# Patient Record
Sex: FEMALE | Race: BLACK OR AFRICAN AMERICAN | NOT HISPANIC OR LATINO | URBAN - METROPOLITAN AREA
[De-identification: names, ages, dates, MRNs, and addresses within clinical notes are randomized per-mention and may not be internally consistent; named-entity substitution may affect disease eponyms.]

---

## 2021-07-27 ENCOUNTER — OUTPATIENT (OUTPATIENT)
Dept: OUTPATIENT SERVICES | Facility: HOSPITAL | Age: 21
LOS: 1 days | End: 2021-07-27
Payer: COMMERCIAL

## 2021-07-27 VITALS — HEART RATE: 81 BPM | SYSTOLIC BLOOD PRESSURE: 127 MMHG | DIASTOLIC BLOOD PRESSURE: 63 MMHG

## 2021-07-27 VITALS — OXYGEN SATURATION: 96 % | HEART RATE: 84 BPM

## 2021-07-27 DIAGNOSIS — Z3A.00 WEEKS OF GESTATION OF PREGNANCY NOT SPECIFIED: ICD-10-CM

## 2021-07-27 DIAGNOSIS — O26.899 OTHER SPECIFIED PREGNANCY RELATED CONDITIONS, UNSPECIFIED TRIMESTER: ICD-10-CM

## 2021-07-27 LAB
ALBUMIN SERPL ELPH-MCNC: 3.9 G/DL — SIGNIFICANT CHANGE UP (ref 3.3–5)
ALP SERPL-CCNC: 38 U/L — LOW (ref 40–120)
ALT FLD-CCNC: 58 U/L — HIGH (ref 10–45)
ANION GAP SERPL CALC-SCNC: 11 MMOL/L — SIGNIFICANT CHANGE UP (ref 5–17)
APPEARANCE UR: CLEAR — SIGNIFICANT CHANGE UP
AST SERPL-CCNC: 37 U/L — SIGNIFICANT CHANGE UP (ref 10–40)
BACTERIA # UR AUTO: NEGATIVE — SIGNIFICANT CHANGE UP
BASOPHILS # BLD AUTO: 0.01 K/UL — SIGNIFICANT CHANGE UP (ref 0–0.2)
BASOPHILS NFR BLD AUTO: 0.2 % — SIGNIFICANT CHANGE UP (ref 0–2)
BILIRUB SERPL-MCNC: 0.2 MG/DL — SIGNIFICANT CHANGE UP (ref 0.2–1.2)
BILIRUB UR-MCNC: NEGATIVE — SIGNIFICANT CHANGE UP
BUN SERPL-MCNC: <4 MG/DL — LOW (ref 7–23)
CALCIUM SERPL-MCNC: 9.8 MG/DL — SIGNIFICANT CHANGE UP (ref 8.4–10.5)
CHLORIDE SERPL-SCNC: 102 MMOL/L — SIGNIFICANT CHANGE UP (ref 96–108)
CO2 SERPL-SCNC: 23 MMOL/L — SIGNIFICANT CHANGE UP (ref 22–31)
COLOR SPEC: SIGNIFICANT CHANGE UP
CREAT SERPL-MCNC: 0.38 MG/DL — LOW (ref 0.5–1.3)
DIFF PNL FLD: NEGATIVE — SIGNIFICANT CHANGE UP
EOSINOPHIL # BLD AUTO: 0.31 K/UL — SIGNIFICANT CHANGE UP (ref 0–0.5)
EOSINOPHIL NFR BLD AUTO: 4.7 % — SIGNIFICANT CHANGE UP (ref 0–6)
EPI CELLS # UR: 1 /HPF — SIGNIFICANT CHANGE UP
GLUCOSE SERPL-MCNC: 74 MG/DL — SIGNIFICANT CHANGE UP (ref 70–99)
GLUCOSE UR QL: NEGATIVE — SIGNIFICANT CHANGE UP
HCT VFR BLD CALC: 33.6 % — LOW (ref 34.5–45)
HGB BLD-MCNC: 11.9 G/DL — SIGNIFICANT CHANGE UP (ref 11.5–15.5)
HYALINE CASTS # UR AUTO: 1 /LPF — SIGNIFICANT CHANGE UP (ref 0–2)
IMM GRANULOCYTES NFR BLD AUTO: 1.1 % — SIGNIFICANT CHANGE UP (ref 0–1.5)
KETONES UR-MCNC: NEGATIVE — SIGNIFICANT CHANGE UP
LEUKOCYTE ESTERASE UR-ACNC: NEGATIVE — SIGNIFICANT CHANGE UP
LYMPHOCYTES # BLD AUTO: 0.93 K/UL — LOW (ref 1–3.3)
LYMPHOCYTES # BLD AUTO: 14 % — SIGNIFICANT CHANGE UP (ref 13–44)
MCHC RBC-ENTMCNC: 31.7 PG — SIGNIFICANT CHANGE UP (ref 27–34)
MCHC RBC-ENTMCNC: 35.4 GM/DL — SIGNIFICANT CHANGE UP (ref 32–36)
MCV RBC AUTO: 89.6 FL — SIGNIFICANT CHANGE UP (ref 80–100)
MONOCYTES # BLD AUTO: 0.84 K/UL — SIGNIFICANT CHANGE UP (ref 0–0.9)
MONOCYTES NFR BLD AUTO: 12.7 % — SIGNIFICANT CHANGE UP (ref 2–14)
NEUTROPHILS # BLD AUTO: 4.47 K/UL — SIGNIFICANT CHANGE UP (ref 1.8–7.4)
NEUTROPHILS NFR BLD AUTO: 67.3 % — SIGNIFICANT CHANGE UP (ref 43–77)
NITRITE UR-MCNC: NEGATIVE — SIGNIFICANT CHANGE UP
NRBC # BLD: 0 /100 WBCS — SIGNIFICANT CHANGE UP (ref 0–0)
PH UR: 7.5 — SIGNIFICANT CHANGE UP (ref 5–8)
PLATELET # BLD AUTO: 197 K/UL — SIGNIFICANT CHANGE UP (ref 150–400)
POTASSIUM SERPL-MCNC: 3.8 MMOL/L — SIGNIFICANT CHANGE UP (ref 3.5–5.3)
POTASSIUM SERPL-SCNC: 3.8 MMOL/L — SIGNIFICANT CHANGE UP (ref 3.5–5.3)
PROT SERPL-MCNC: 6.8 G/DL — SIGNIFICANT CHANGE UP (ref 6–8.3)
PROT UR-MCNC: NEGATIVE — SIGNIFICANT CHANGE UP
RBC # BLD: 3.75 M/UL — LOW (ref 3.8–5.2)
RBC # FLD: 12.4 % — SIGNIFICANT CHANGE UP (ref 10.3–14.5)
RBC CASTS # UR COMP ASSIST: 1 /HPF — SIGNIFICANT CHANGE UP (ref 0–4)
SODIUM SERPL-SCNC: 136 MMOL/L — SIGNIFICANT CHANGE UP (ref 135–145)
SP GR SPEC: 1.01 — SIGNIFICANT CHANGE UP (ref 1.01–1.02)
UROBILINOGEN FLD QL: NEGATIVE — SIGNIFICANT CHANGE UP
WBC # BLD: 6.63 K/UL — SIGNIFICANT CHANGE UP (ref 3.8–10.5)
WBC # FLD AUTO: 6.63 K/UL — SIGNIFICANT CHANGE UP (ref 3.8–10.5)
WBC UR QL: 2 /HPF — SIGNIFICANT CHANGE UP (ref 0–5)

## 2021-07-27 PROCEDURE — G0463: CPT

## 2021-07-27 PROCEDURE — 81001 URINALYSIS AUTO W/SCOPE: CPT

## 2021-07-27 PROCEDURE — 80053 COMPREHEN METABOLIC PANEL: CPT

## 2021-07-27 PROCEDURE — 85025 COMPLETE CBC W/AUTO DIFF WBC: CPT

## 2021-07-27 RX ORDER — SODIUM CHLORIDE 9 MG/ML
1000 INJECTION, SOLUTION INTRAVENOUS
Refills: 0 | Status: DISCONTINUED | OUTPATIENT
Start: 2021-07-27 | End: 2021-08-10

## 2021-07-27 NOTE — OB PROVIDER TRIAGE NOTE - NSOBPROVIDERNOTE_OBGYN_ALL_OB_FT
Pt is a 21y  at 21.6w presenting to L&D due to abdominal as well as intermittent back and R leg pain since last night. Pt hemodynamically stable, no signs of PTL. Pt afebrile with normal WBC count. Pt's abdominal symptoms likely related to gas pain vs. dehydration vs. gastroenteritis, r/o UTI and nephrolithiasis, appendicitis less likely.    Plan:  -IVF  -UA  -CBC  -CMP  -Observe for worsening pain or signs of ctx Pt is a 21y  at 21.6w presenting to L&D due to abdominal as well as intermittent back and R leg pain since last night. Pt hemodynamically stable, no signs of PTL. Pt afebrile with normal WBC count. Pt's abdominal symptoms likely related to gas pain vs. dehydration vs. gastroenteritis, r/o UTI and nephrolithiasis, appendicitis less likely.    Plan:  -IVF  -UA  -CBC  -CMP  -Observe for worsening pain or signs of ctx    PA Note  Pt seen and examined with Sujey Iqbal and Dr Koby Calvert. Agree with above note. WIll follow up on labs and see if pt tolerates regular diet.  Sherin Mendoza PAC Pt is a 21y  at 21.6w presenting to L&D due to abdominal as well as intermittent back and R leg pain since last night. Pt hemodynamically stable, no signs of PTL. Pt afebrile with normal WBC count. Pt's abdominal symptoms likely related to gas pain vs. dehydration vs. gastroenteritis, r/o UTI and nephrolithiasis, appendicitis less likely.    Plan:  -IVF  -UA  -CBC  -CMP  -Observe for worsening pain or signs of ctx    PA Note  Pt seen and examined with Sujey Iqbal and Dr Koby Calvert. Agree with above note. WIll follow up on labs and see if pt tolerates regular diet.  Sherin GOODWIN    Pt feeling better after eating. Labs reviewed and wnl except for ALT 58. Results printed out for pt to discuss with Dr Encinas.  DC Home. Discussed with Dr Nehal GOODWIN

## 2021-07-27 NOTE — OB RN TRIAGE NOTE - CHIEF COMPLAINT QUOTE
Since last night I have lower abdominal pian and back pain. I also have pain down rt leg and right arm pain.

## 2021-07-27 NOTE — OB PROVIDER TRIAGE NOTE - NSHPPHYSICALEXAM_GEN_ALL_CORE
Vital Signs Last 24 Hrs  T(C): 37.1 (27 Jul 2021 10:18), Max: 37.1 (27 Jul 2021 10:18)  T(F): 98.8 (27 Jul 2021 10:18), Max: 98.8 (27 Jul 2021 10:18)  HR: 69 (27 Jul 2021 11:45) (69 - 86)  BP: 127/63 (27 Jul 2021 10:18) (104/68 - 127/63)  BP(mean): --  RR: 18 (27 Jul 2021 10:18) (16 - 18)  SpO2: 100% (27 Jul 2021 11:45) (90% - 100%)    Physical Exam:  Gen: NAD  Resp: breathing comfortably on RA  Abd: gravid, soft, TTP in RUQ and RLQ with notable gas pocket  Back: +R musculoskeletal pain, no CVA tenderness  Ext: no peripheral edema  SVE: cervix closed and long    [place]: no contractions

## 2021-07-27 NOTE — OB PROVIDER TRIAGE NOTE - NSHPLABSRESULTS_GEN_ALL_CORE
11.9   6.63  )-----------( 197      ( 2021 11:21 )             33.6     Urinalysis Basic - ( 2021 11:21 )    Color: Light Yellow / Appearance: Clear / S.014 / pH: x  Gluc: x / Ketone: Negative  / Bili: Negative / Urobili: Negative   Blood: x / Protein: Negative / Nitrite: Negative   Leuk Esterase: Negative / RBC: 1 /hpf / WBC 2 /HPF   Sq Epi: x / Non Sq Epi: 1 /hpf / Bacteria: Negative

## 2021-07-27 NOTE — OB PROVIDER TRIAGE NOTE - HISTORY OF PRESENT ILLNESS
Pt is a 21y  at 21.6w presenting to L&D due to abdominal pain as well as back and R leg pain since last night. Pt reports the abdominal pain began suddenly and describes the pain as pressure and a tightening sensation. She reports the abdominal pain is diffuse and periumbilical. She describes the back pain as shock-like sensations radiating down her R leg. She denies hx of similar pain episodes earlier this pregnancy. The last time the pt ate or drank was 7p last night when she ate a burrito. Pt reports passing a normal BM this morning. Pt reports a hx of intermittent constipation during the pregnancy, was previously on Fe supplement but stopped taking about 2w ago due to constipation. Pt denies vaginal bleeding, leakage of fluid, or contractions. +FM. Pt denies SOB, chest pain, headache, vision changes, or leg swelling. She denies fevers, chills, diarrhea, dysuria, hematuria, or sick contacts.     OB Hx: circumvallate placenta (pt of Dr. Zapata at [place]), no complications thus far in the pregnancy  GYN Hx: denies fibroids, cysts, STIs. Pt denies ever having Pap smear.  PMH: asthma (pt reports is well-controlled), sickle cell trait  PSHx: denies  Meds: albuterol inhaler PRN, PNV  All: seafood, NKDA  Social: pt lives with her mom and sisters, reports feeling safe at home. Denies smoking, alcohol or drug use.

## 2021-08-05 PROBLEM — Z00.00 ENCOUNTER FOR PREVENTIVE HEALTH EXAMINATION: Noted: 2021-08-05

## 2021-08-10 ENCOUNTER — APPOINTMENT (OUTPATIENT)
Dept: ANTEPARTUM | Facility: CLINIC | Age: 21
End: 2021-08-10
Payer: COMMERCIAL

## 2021-08-10 ENCOUNTER — ASOB RESULT (OUTPATIENT)
Age: 21
End: 2021-08-10

## 2021-08-10 PROCEDURE — 76811 OB US DETAILED SNGL FETUS: CPT

## 2021-08-10 PROCEDURE — 99203 OFFICE O/P NEW LOW 30 MIN: CPT | Mod: 25

## 2021-08-10 PROCEDURE — 76817 TRANSVAGINAL US OBSTETRIC: CPT

## 2021-09-22 ENCOUNTER — APPOINTMENT (OUTPATIENT)
Dept: ANTEPARTUM | Facility: CLINIC | Age: 21
End: 2021-09-22

## 2021-09-29 ENCOUNTER — APPOINTMENT (OUTPATIENT)
Dept: ANTEPARTUM | Facility: CLINIC | Age: 21
End: 2021-09-29
Payer: COMMERCIAL

## 2021-09-29 ENCOUNTER — ASOB RESULT (OUTPATIENT)
Age: 21
End: 2021-09-29

## 2021-09-29 PROCEDURE — 76816 OB US FOLLOW-UP PER FETUS: CPT

## 2021-10-27 ENCOUNTER — ASOB RESULT (OUTPATIENT)
Age: 21
End: 2021-10-27

## 2021-10-27 ENCOUNTER — APPOINTMENT (OUTPATIENT)
Dept: ANTEPARTUM | Facility: CLINIC | Age: 21
End: 2021-10-27
Payer: COMMERCIAL

## 2021-10-27 ENCOUNTER — APPOINTMENT (OUTPATIENT)
Dept: ANTEPARTUM | Facility: CLINIC | Age: 21
End: 2021-10-27

## 2021-10-27 PROCEDURE — 76816 OB US FOLLOW-UP PER FETUS: CPT

## 2021-10-27 PROCEDURE — 76818 FETAL BIOPHYS PROFILE W/NST: CPT

## 2021-11-17 ENCOUNTER — OUTPATIENT (OUTPATIENT)
Dept: OUTPATIENT SERVICES | Facility: HOSPITAL | Age: 21
LOS: 1 days | End: 2021-11-17
Payer: MEDICAID

## 2021-11-17 DIAGNOSIS — O26.899 OTHER SPECIFIED PREGNANCY RELATED CONDITIONS, UNSPECIFIED TRIMESTER: ICD-10-CM

## 2021-11-17 DIAGNOSIS — Z3A.00 WEEKS OF GESTATION OF PREGNANCY NOT SPECIFIED: ICD-10-CM

## 2021-11-17 PROCEDURE — G0463: CPT

## 2021-11-17 PROCEDURE — 59025 FETAL NON-STRESS TEST: CPT

## 2021-11-26 ENCOUNTER — RESULT REVIEW (OUTPATIENT)
Age: 21
End: 2021-11-26

## 2021-11-26 ENCOUNTER — OUTPATIENT (OUTPATIENT)
Dept: OUTPATIENT SERVICES | Facility: HOSPITAL | Age: 21
LOS: 1 days | End: 2021-11-26
Payer: MEDICAID

## 2021-11-26 DIAGNOSIS — O26.899 OTHER SPECIFIED PREGNANCY RELATED CONDITIONS, UNSPECIFIED TRIMESTER: ICD-10-CM

## 2021-11-26 DIAGNOSIS — Z3A.00 WEEKS OF GESTATION OF PREGNANCY NOT SPECIFIED: ICD-10-CM

## 2021-11-26 LAB — AMNISURE ROM (RUPTURE OF MEMBRANES): NEGATIVE — SIGNIFICANT CHANGE UP

## 2021-11-26 PROCEDURE — 59025 FETAL NON-STRESS TEST: CPT

## 2021-11-26 PROCEDURE — 76818 FETAL BIOPHYS PROFILE W/NST: CPT

## 2021-11-26 PROCEDURE — 76818 FETAL BIOPHYS PROFILE W/NST: CPT | Mod: 26

## 2021-11-26 PROCEDURE — 84112 EVAL AMNIOTIC FLUID PROTEIN: CPT

## 2021-11-26 PROCEDURE — G0463: CPT

## 2021-11-27 ENCOUNTER — INPATIENT (INPATIENT)
Facility: HOSPITAL | Age: 21
LOS: 1 days | Discharge: HOME | End: 2021-11-29
Attending: OBSTETRICS & GYNECOLOGY | Admitting: OBSTETRICS & GYNECOLOGY
Payer: COMMERCIAL

## 2021-11-27 ENCOUNTER — OUTPATIENT (OUTPATIENT)
Dept: OUTPATIENT SERVICES | Facility: HOSPITAL | Age: 21
LOS: 1 days | End: 2021-11-27
Payer: MEDICAID

## 2021-11-27 VITALS
TEMPERATURE: 100 F | HEART RATE: 102 BPM | RESPIRATION RATE: 20 BRPM | DIASTOLIC BLOOD PRESSURE: 85 MMHG | SYSTOLIC BLOOD PRESSURE: 152 MMHG

## 2021-11-27 DIAGNOSIS — Z3A.00 WEEKS OF GESTATION OF PREGNANCY NOT SPECIFIED: ICD-10-CM

## 2021-11-27 DIAGNOSIS — O26.899 OTHER SPECIFIED PREGNANCY RELATED CONDITIONS, UNSPECIFIED TRIMESTER: ICD-10-CM

## 2021-11-27 PROCEDURE — 59025 FETAL NON-STRESS TEST: CPT

## 2021-11-27 PROCEDURE — G0463: CPT

## 2021-11-28 ENCOUNTER — TRANSCRIPTION ENCOUNTER (OUTPATIENT)
Age: 21
End: 2021-11-28

## 2021-11-28 DIAGNOSIS — Z34.80 ENCOUNTER FOR SUPERVISION OF OTHER NORMAL PREGNANCY, UNSPECIFIED TRIMESTER: ICD-10-CM

## 2021-11-28 LAB
ALBUMIN SERPL ELPH-MCNC: 3.8 G/DL — SIGNIFICANT CHANGE UP (ref 3.3–5)
ALP SERPL-CCNC: 202 U/L — HIGH (ref 40–120)
ALT FLD-CCNC: 11 U/L — SIGNIFICANT CHANGE UP (ref 10–45)
ANION GAP SERPL CALC-SCNC: 15 MMOL/L — SIGNIFICANT CHANGE UP (ref 5–17)
APPEARANCE UR: CLEAR — SIGNIFICANT CHANGE UP
APTT BLD: 25.5 SEC — LOW (ref 27.5–35.5)
AST SERPL-CCNC: 22 U/L — SIGNIFICANT CHANGE UP (ref 10–40)
BACTERIA # UR AUTO: NEGATIVE — SIGNIFICANT CHANGE UP
BASOPHILS # BLD AUTO: 0.02 K/UL — SIGNIFICANT CHANGE UP (ref 0–0.2)
BASOPHILS NFR BLD AUTO: 0.3 % — SIGNIFICANT CHANGE UP (ref 0–2)
BILIRUB SERPL-MCNC: 0.4 MG/DL — SIGNIFICANT CHANGE UP (ref 0.2–1.2)
BILIRUB UR-MCNC: NEGATIVE — SIGNIFICANT CHANGE UP
BLD GP AB SCN SERPL QL: NEGATIVE — SIGNIFICANT CHANGE UP
BUN SERPL-MCNC: 4 MG/DL — LOW (ref 7–23)
CALCIUM SERPL-MCNC: 9 MG/DL — SIGNIFICANT CHANGE UP (ref 8.4–10.5)
CHLORIDE SERPL-SCNC: 102 MMOL/L — SIGNIFICANT CHANGE UP (ref 96–108)
CO2 SERPL-SCNC: 20 MMOL/L — LOW (ref 22–31)
COLOR SPEC: YELLOW — SIGNIFICANT CHANGE UP
COVID-19 SPIKE DOMAIN AB INTERP: NEGATIVE — SIGNIFICANT CHANGE UP
COVID-19 SPIKE DOMAIN ANTIBODY RESULT: 0.4 U/ML — SIGNIFICANT CHANGE UP
CREAT ?TM UR-MCNC: 135 MG/DL — SIGNIFICANT CHANGE UP
CREAT SERPL-MCNC: 0.63 MG/DL — SIGNIFICANT CHANGE UP (ref 0.5–1.3)
DIFF PNL FLD: NEGATIVE — SIGNIFICANT CHANGE UP
EOSINOPHIL # BLD AUTO: 0.34 K/UL — SIGNIFICANT CHANGE UP (ref 0–0.5)
EOSINOPHIL NFR BLD AUTO: 4.5 % — SIGNIFICANT CHANGE UP (ref 0–6)
EPI CELLS # UR: 2 /HPF — SIGNIFICANT CHANGE UP
FIBRINOGEN PPP-MCNC: 646 MG/DL — HIGH (ref 290–520)
GLUCOSE SERPL-MCNC: 79 MG/DL — SIGNIFICANT CHANGE UP (ref 70–99)
GLUCOSE UR QL: NEGATIVE — SIGNIFICANT CHANGE UP
HCT VFR BLD CALC: 35.2 % — SIGNIFICANT CHANGE UP (ref 34.5–45)
HGB BLD-MCNC: 11.8 G/DL — SIGNIFICANT CHANGE UP (ref 11.5–15.5)
HYALINE CASTS # UR AUTO: 0 /LPF — SIGNIFICANT CHANGE UP (ref 0–7)
IMM GRANULOCYTES NFR BLD AUTO: 0.4 % — SIGNIFICANT CHANGE UP (ref 0–1.5)
INR BLD: 0.97 RATIO — SIGNIFICANT CHANGE UP (ref 0.88–1.16)
KETONES UR-MCNC: NEGATIVE — SIGNIFICANT CHANGE UP
LDH SERPL L TO P-CCNC: 253 U/L — HIGH (ref 50–242)
LEUKOCYTE ESTERASE UR-ACNC: NEGATIVE — SIGNIFICANT CHANGE UP
LYMPHOCYTES # BLD AUTO: 0.7 K/UL — LOW (ref 1–3.3)
LYMPHOCYTES # BLD AUTO: 9.3 % — LOW (ref 13–44)
MCHC RBC-ENTMCNC: 29.4 PG — SIGNIFICANT CHANGE UP (ref 27–34)
MCHC RBC-ENTMCNC: 33.5 GM/DL — SIGNIFICANT CHANGE UP (ref 32–36)
MCV RBC AUTO: 87.8 FL — SIGNIFICANT CHANGE UP (ref 80–100)
MONOCYTES # BLD AUTO: 1.15 K/UL — HIGH (ref 0–0.9)
MONOCYTES NFR BLD AUTO: 15.3 % — HIGH (ref 2–14)
NEUTROPHILS # BLD AUTO: 5.27 K/UL — SIGNIFICANT CHANGE UP (ref 1.8–7.4)
NEUTROPHILS NFR BLD AUTO: 70.2 % — SIGNIFICANT CHANGE UP (ref 43–77)
NITRITE UR-MCNC: NEGATIVE — SIGNIFICANT CHANGE UP
NRBC # BLD: 0 /100 WBCS — SIGNIFICANT CHANGE UP (ref 0–0)
PH UR: 7 — SIGNIFICANT CHANGE UP (ref 5–8)
PLATELET # BLD AUTO: 166 K/UL — SIGNIFICANT CHANGE UP (ref 150–400)
POTASSIUM SERPL-MCNC: 3.5 MMOL/L — SIGNIFICANT CHANGE UP (ref 3.5–5.3)
POTASSIUM SERPL-SCNC: 3.5 MMOL/L — SIGNIFICANT CHANGE UP (ref 3.5–5.3)
PROT ?TM UR-MCNC: 32 MG/DL — HIGH (ref 0–12)
PROT ?TM UR-MCNC: 33 MG/DL — HIGH (ref 0–12)
PROT SERPL-MCNC: 6.7 G/DL — SIGNIFICANT CHANGE UP (ref 6–8.3)
PROT UR-MCNC: ABNORMAL
PROT/CREAT UR-RTO: 0.2 RATIO — SIGNIFICANT CHANGE UP (ref 0–0.2)
PROTHROM AB SERPL-ACNC: 11.7 SEC — SIGNIFICANT CHANGE UP (ref 10.6–13.6)
RAPID RVP RESULT: DETECTED
RBC # BLD: 4.01 M/UL — SIGNIFICANT CHANGE UP (ref 3.8–5.2)
RBC # FLD: 13.1 % — SIGNIFICANT CHANGE UP (ref 10.3–14.5)
RBC CASTS # UR COMP ASSIST: 2 /HPF — SIGNIFICANT CHANGE UP (ref 0–4)
RH IG SCN BLD-IMP: POSITIVE — SIGNIFICANT CHANGE UP
RV+EV RNA SPEC QL NAA+PROBE: DETECTED
SARS-COV-2 IGG+IGM SERPL QL IA: 0.4 U/ML — SIGNIFICANT CHANGE UP
SARS-COV-2 IGG+IGM SERPL QL IA: NEGATIVE — SIGNIFICANT CHANGE UP
SARS-COV-2 RNA SPEC QL NAA+PROBE: SIGNIFICANT CHANGE UP
SODIUM SERPL-SCNC: 137 MMOL/L — SIGNIFICANT CHANGE UP (ref 135–145)
SP GR SPEC: 1.01 — SIGNIFICANT CHANGE UP (ref 1.01–1.02)
URATE SERPL-MCNC: 4.9 MG/DL — SIGNIFICANT CHANGE UP (ref 2.5–7)
UROBILINOGEN FLD QL: ABNORMAL
WBC # BLD: 7.51 K/UL — SIGNIFICANT CHANGE UP (ref 3.8–10.5)
WBC # FLD AUTO: 7.51 K/UL — SIGNIFICANT CHANGE UP (ref 3.8–10.5)
WBC UR QL: 3 /HPF — SIGNIFICANT CHANGE UP (ref 0–5)

## 2021-11-28 PROCEDURE — 88307 TISSUE EXAM BY PATHOLOGIST: CPT | Mod: 26

## 2021-11-28 PROCEDURE — 59409 OBSTETRICAL CARE: CPT | Mod: U9,GC

## 2021-11-28 RX ORDER — AER TRAVELER 0.5 G/1
1 SOLUTION RECTAL; TOPICAL EVERY 4 HOURS
Refills: 0 | Status: DISCONTINUED | OUTPATIENT
Start: 2021-11-28 | End: 2021-11-29

## 2021-11-28 RX ORDER — IBUPROFEN 200 MG
600 TABLET ORAL EVERY 6 HOURS
Refills: 0 | Status: COMPLETED | OUTPATIENT
Start: 2021-11-28 | End: 2022-10-27

## 2021-11-28 RX ORDER — DIBUCAINE 1 %
1 OINTMENT (GRAM) RECTAL EVERY 6 HOURS
Refills: 0 | Status: DISCONTINUED | OUTPATIENT
Start: 2021-11-28 | End: 2021-11-29

## 2021-11-28 RX ORDER — ACETAMINOPHEN 500 MG
975 TABLET ORAL
Refills: 0 | Status: DISCONTINUED | OUTPATIENT
Start: 2021-11-28 | End: 2021-11-29

## 2021-11-28 RX ORDER — MAGNESIUM HYDROXIDE 400 MG/1
30 TABLET, CHEWABLE ORAL
Refills: 0 | Status: DISCONTINUED | OUTPATIENT
Start: 2021-11-28 | End: 2021-11-29

## 2021-11-28 RX ORDER — TETANUS TOXOID, REDUCED DIPHTHERIA TOXOID AND ACELLULAR PERTUSSIS VACCINE, ADSORBED 5; 2.5; 8; 8; 2.5 [IU]/.5ML; [IU]/.5ML; UG/.5ML; UG/.5ML; UG/.5ML
0.5 SUSPENSION INTRAMUSCULAR ONCE
Refills: 0 | Status: DISCONTINUED | OUTPATIENT
Start: 2021-11-28 | End: 2021-11-29

## 2021-11-28 RX ORDER — OXYTOCIN 10 UNIT/ML
333.33 VIAL (ML) INJECTION
Qty: 20 | Refills: 0 | Status: DISCONTINUED | OUTPATIENT
Start: 2021-11-28 | End: 2021-11-29

## 2021-11-28 RX ORDER — SODIUM CHLORIDE 9 MG/ML
1000 INJECTION, SOLUTION INTRAVENOUS
Refills: 0 | Status: DISCONTINUED | OUTPATIENT
Start: 2021-11-28 | End: 2021-11-28

## 2021-11-28 RX ORDER — IBUPROFEN 200 MG
600 TABLET ORAL EVERY 6 HOURS
Refills: 0 | Status: DISCONTINUED | OUTPATIENT
Start: 2021-11-28 | End: 2021-11-29

## 2021-11-28 RX ORDER — OXYCODONE HYDROCHLORIDE 5 MG/1
5 TABLET ORAL
Refills: 0 | Status: DISCONTINUED | OUTPATIENT
Start: 2021-11-28 | End: 2021-11-29

## 2021-11-28 RX ORDER — DIPHENHYDRAMINE HCL 50 MG
25 CAPSULE ORAL EVERY 6 HOURS
Refills: 0 | Status: DISCONTINUED | OUTPATIENT
Start: 2021-11-28 | End: 2021-11-29

## 2021-11-28 RX ORDER — SIMETHICONE 80 MG/1
80 TABLET, CHEWABLE ORAL EVERY 4 HOURS
Refills: 0 | Status: DISCONTINUED | OUTPATIENT
Start: 2021-11-28 | End: 2021-11-29

## 2021-11-28 RX ORDER — PRAMOXINE HYDROCHLORIDE 150 MG/15G
1 AEROSOL, FOAM RECTAL EVERY 4 HOURS
Refills: 0 | Status: DISCONTINUED | OUTPATIENT
Start: 2021-11-28 | End: 2021-11-29

## 2021-11-28 RX ORDER — HYDROCORTISONE 1 %
1 OINTMENT (GRAM) TOPICAL EVERY 6 HOURS
Refills: 0 | Status: DISCONTINUED | OUTPATIENT
Start: 2021-11-28 | End: 2021-11-29

## 2021-11-28 RX ORDER — KETOROLAC TROMETHAMINE 30 MG/ML
30 SYRINGE (ML) INJECTION ONCE
Refills: 0 | Status: DISCONTINUED | OUTPATIENT
Start: 2021-11-28 | End: 2021-11-29

## 2021-11-28 RX ORDER — CITRIC ACID/SODIUM CITRATE 300-500 MG
15 SOLUTION, ORAL ORAL EVERY 6 HOURS
Refills: 0 | Status: DISCONTINUED | OUTPATIENT
Start: 2021-11-28 | End: 2021-11-28

## 2021-11-28 RX ORDER — OXYTOCIN 10 UNIT/ML
2 VIAL (ML) INJECTION
Qty: 30 | Refills: 0 | Status: DISCONTINUED | OUTPATIENT
Start: 2021-11-28 | End: 2021-11-28

## 2021-11-28 RX ORDER — LANOLIN
1 OINTMENT (GRAM) TOPICAL EVERY 6 HOURS
Refills: 0 | Status: DISCONTINUED | OUTPATIENT
Start: 2021-11-28 | End: 2021-11-29

## 2021-11-28 RX ORDER — OXYTOCIN 10 UNIT/ML
10 VIAL (ML) INJECTION ONCE
Refills: 0 | Status: DISCONTINUED | OUTPATIENT
Start: 2021-11-28 | End: 2021-11-29

## 2021-11-28 RX ORDER — BENZOCAINE 10 %
1 GEL (GRAM) MUCOUS MEMBRANE EVERY 6 HOURS
Refills: 0 | Status: DISCONTINUED | OUTPATIENT
Start: 2021-11-28 | End: 2021-11-29

## 2021-11-28 RX ORDER — SODIUM CHLORIDE 9 MG/ML
3 INJECTION INTRAMUSCULAR; INTRAVENOUS; SUBCUTANEOUS EVERY 8 HOURS
Refills: 0 | Status: DISCONTINUED | OUTPATIENT
Start: 2021-11-28 | End: 2021-11-29

## 2021-11-28 RX ORDER — OXYCODONE HYDROCHLORIDE 5 MG/1
5 TABLET ORAL ONCE
Refills: 0 | Status: DISCONTINUED | OUTPATIENT
Start: 2021-11-28 | End: 2021-11-29

## 2021-11-28 RX ADMIN — Medication 975 MILLIGRAM(S): at 20:59

## 2021-11-28 RX ADMIN — SODIUM CHLORIDE 125 MILLILITER(S): 9 INJECTION, SOLUTION INTRAVENOUS at 03:04

## 2021-11-28 RX ADMIN — Medication 975 MILLIGRAM(S): at 20:29

## 2021-11-28 RX ADMIN — Medication 4 MILLIUNIT(S)/MIN: at 09:09

## 2021-11-28 RX ADMIN — Medication 600 MILLIGRAM(S): at 23:34

## 2021-11-28 NOTE — DISCHARGE NOTE OB - PLAN OF CARE
Make your follow-up appointment with your doctor in 3 days for a blood pressure check after discharge. After discharge, please stay on pelvic rest for 6 weeks, meaning no sexual intercourse, no tampons and no douching.  No driving for 2 weeks as women can loose a lot of blood during delivery and there is a possibility of being lightheaded/fainting.  No lifting objects heavier than baby for two weeks.  Expect to have vaginal bleeding/spotting for up to six weeks.  The bleeding should get lighter and more white/light brown with time.  For bleeding soaking more than a pad an hour or passing clots greater than the size of your fist, come in to the emergency department. Call your doctor with any signs and symptoms of infection such as fever, chills, nausea, or vomiting. Call your doctor if you're unable to tolerate food, increase in vaginal bleeding, or have difficulty urinating. Call your doctor if you have pain that is not relieved by your prescribed medications. Notify your doctor with any other concerns.   No heavy lifting, driving, or strenuous activity for 6 weeks.     Follow up in clinic in 3 days for a blood pressure check and in 6 weeks for your routine postpartum visit. Make your follow-up appointment with your doctor in 3 days for a blood pressure check after discharge and 6 weeks for your routine postpartum visit. After discharge, please stay on pelvic rest for 6 weeks, meaning no sexual intercourse, no tampons and no douching.  No driving for 2 weeks as women can loose a lot of blood during delivery and there is a possibility of being lightheaded/fainting.  No lifting objects heavier than baby for two weeks.  Expect to have vaginal bleeding/spotting for up to six weeks.  The bleeding should get lighter and more white/light brown with time.  For bleeding soaking more than a pad an hour or passing clots greater than the size of your fist, come in to the emergency department. Call your doctor with any signs and symptoms of infection such as fever, chills, nausea, or vomiting. Call your doctor if you're unable to tolerate food, increase in vaginal bleeding, or have difficulty urinating. Call your doctor if you have pain that is not relieved by your prescribed medications. Notify your doctor with any other concerns.   No heavy lifting, driving, or strenuous activity for 6 weeks.     Follow up in clinic in 3 days for a blood pressure check and in 6 weeks for your routine postpartum visit.

## 2021-11-28 NOTE — OB RN DELIVERY SUMMARY - NS_GBSABX_OBGYN_ALL_OB
Spoke with the patient relaying message below and informed that she should make sure to get her fasting labs done.     No further questions or concerns at this time.    N/A

## 2021-11-28 NOTE — OB PROVIDER H&P - ASSESSMENT
A/P: Pt is a 22yo  who presents in early labor with elevated BPs in triage.   - no PN issues, GBS negative     1. Admit to LND. Routine Labs. IVF  2. IOL with buccal cytotec  3. Fetus: Cat I tracing, Vertex, EFW 3000g. C/w EFM.  4. Elevated BPs: f/u HELLP labs, monitor BPs  5. GBS negative   6. Pain: IV pain meds/epidural PRN    Dorcas Coelho, PGY1  d/w Dr. Marin  d/w Dr. Fernandez

## 2021-11-28 NOTE — OB PROVIDER TRIAGE NOTE - HISTORY OF PRESENT ILLNESS
HPI: Pt is a 22 yo  @39+4 presenting with contractions. Pt states she has been feeling contractions irregularly for the last 2 days. They became more regular starting 7 pm tonight and increasingly painful, q5 min. Pt states she visited her regular OB at [facility] earlier today and was told she was 1 cm dilated and sent home. Pt states she came here for a 2nd opinion.   FM endorses   LOF denies   VB denies     PN Care with Dr. Yosvany Shukla at [facility].   Prenatal Issues: denies   GBS negative   EFW 3000g    OBHx:  G1 current     Gyn Hx:  denies     PMH:   denies     SHx:  denies     Psych:   denies     Social:  denies     Medications: PNV    Allergies: none     Will Accept blood transfusion? yes     Vital Signs Last 24 Hrs  T(C): 37.6 (2021 23:21), Max: 37.6 (2021 23:21)  T(F): 99.7 (2021 23:21), Max: 99.7 (2021 23:21)  HR: 94 (2021 23:59) (94 - 102)  BP: 137/90 (2021 23:59) (129/83 - 152/85)  BP(mean): --  RR: 20 (2021 23:21) (20 - 20)  SpO2: --    Physical Exam:  Gen: NAD, AOx3  CV: RR  Resp: unlabored respirations  Abd: soft, NT, ND    FHT: 130s, moderate variability, accels, no decels   [place]: q4-5 min   EFW: 3000g  Sono: vertex, fundal

## 2021-11-28 NOTE — DISCHARGE NOTE OB - HOSPITAL COURSE
21 y  G_P_ who experienced xx at _ weeks & _ days with labile BPs in triage, meeting criteria for gestational HTN (HELLP wnl, P/C 0.2).  Labor course was uncomplicated *** & delivery was uncomplicated. Postpartum course was unremarkable. Patient was transferred to postpartum floor & monitored. Pt was voiding spontaneously with normal vital signs. Patient is medically optimized for discharge & instructed to follow up with her doctor in 3 days for a blood pressure check and in 6 weeks for postpartum care. Pt provided with Rx for BP cuff and norethindrone pills for postpartum contraception. 21 y   who experienced  at 39 weeks & 3 days who met criteria for gestational HTN (HELLP wnl, P/C 0.2) during labor course. Delivery was uncomplicated. Postpartum course was unremarkable. Patient was transferred to postpartum floor & monitored. Pt was voiding spontaneously with normal vital signs. Patient is medically optimized for discharge & instructed to follow up with her doctor in 3 days for a blood pressure check and in 6 weeks for postpartum care. Pt provided with Rx for BP cuff and norethindrone pills for postpartum contraception. 21 y  who experienced  at 39 weeks & 3 days who met criteria for gestational HTN (HELLP wnl, P/C 0.2) during labor course. Delivery was uncomplicated. Postpartum course was unremarkable. Patient was transferred to postpartum floor & monitored. Pt was voiding spontaneously with normal vital signs. Patient is medically optimized for discharge & instructed to follow up with her doctor in 3 days for a blood pressure check and in 6 weeks for postpartum care.   Pt provided with Rx for BP cuff and norethindrone pills for postpartum contraception.

## 2021-11-28 NOTE — OB PROVIDER DELIVERY SUMMARY - NSPROVIDERDELIVERYNOTE_OBGYN_ALL_OB_FT
Spontaneous vaginal delivery of liveborn infant in LACEY position. Head delivered easily. No nuchal cord was noted. Shoulders and body delivered easily after. Infant was suctioned. No mec was noted. Infant was passed to mother for delayed cord clamping and skin to skin. After 1 minute, the cord was clamped and cut. Placenta delivered intact. Uterine fundal massage and post partum pitocin was started & IM pitocin given. Uterine fundus was firm. Vaginal exam was performed and noted intact cervix, vaginal walls and sulci. 2nd degree laceration was noted and repaired with 2.0 Vicryl rapide. Excellent hemostasis was noted. Count was correctx2. Pt. was stable after delivery. Spontaneous vaginal delivery of liveborn female infant in LACEY position. Head delivered easily. No nuchal cord was noted. Shoulders and body delivered easily after. Infant was suctioned. No mec was noted. Infant was passed to mother for delayed cord clamping and skin to skin. After 1 minute, the cord was clamped and cut. Placenta delivered intact. Uterine fundal massage and post partum pitocin was started & IM pitocin given. Uterine fundus was firm. Vaginal exam was performed and noted intact cervix, vaginal walls and sulci. 2nd degree laceration was noted and repaired with 2.0 Vicryl rapide. Excellent hemostasis was noted. Count was correctx2. Pt. was stable after delivery.      /Attending:    I was present for the entire delivery and I agree with the above Resident's note.    Dr. Guerrero

## 2021-11-28 NOTE — OB PROVIDER TRIAGE NOTE - NSOBPROVIDERNOTE_OBGYN_ALL_OB_FT
A/P: Pt is a 22yo  who presents in early labor. Initial BP in triage 150s systolic  - no PN issues, GBS negative     1. 4 hour BP monitoring in triage, HELLP labs sent   2. Fetus: Cat I tracing, Vertex, EFW 3000g. C/w EFM.  3. GBS negative   4. Pain: IV pain meds/epidural PRN    Dorcas Coelho, PGY1  d/w Dr. Fernandez

## 2021-11-28 NOTE — DISCHARGE NOTE OB - PATIENT PORTAL LINK FT
You can access the FollowMyHealth Patient Portal offered by [facility] by registering at the following website: [email or web address]. By joining [facility]’s FollowMyHealth portal, you will also be able to view your health information using other applications (apps) compatible with our system.

## 2021-11-28 NOTE — DISCHARGE NOTE OB - CARE PROVIDER_API CALL
[facility],   [address] [place] [address]  Phone: [phone]  Fax: (   )    -  Follow Up Time: 1-3 days    Dr. Yosvany Shukla,   [place]  Phone: (   )    -  Fax: (   )    -  Follow Up Time:

## 2021-11-28 NOTE — OB RN DELIVERY SUMMARY - NSSELHIDDEN_OBGYN_ALL_OB_FT
[NS_[email or web address]_ALL_OB_FT:[id number]],[NS_[email or web address]_OB_[id number]_DeliveryAssist2_OBGYN_ALL_OB_FT:[id number]],[NS_DeliveryRN_OBGYN_ALL_OB_FT:[id number]]

## 2021-11-28 NOTE — DISCHARGE NOTE OB - MEDICATION SUMMARY - MEDICATIONS TO TAKE
I will START or STAY ON the medications listed below when I get home from the hospital:    Blood Pressure Cuff  -- 1 application implant 3 times a day   -- Indication: For gestational hypertension    acetaminophen 500 mg oral capsule  -- 2 cap(s) by mouth every 8 hours -for moderate pain   -- This product contains acetaminophen.  Do not use  with any other product containing acetaminophen to prevent possible liver damage.    -- Indication: For pain    ibuprofen 600 mg oral tablet  -- 1 tab(s) by mouth every 6 hours -for moderate pain   -- Do not take this drug if you are pregnant.  It is very important that you take or use this exactly as directed.  Do not skip doses or discontinue unless directed by your doctor.  May cause drowsiness or dizziness.  Obtain medical advice before taking any non-prescription drugs as some may affect the action of this medication.  Take with food or milk.    -- Indication: For pain    witch hazel 50% topical pad  -- 1 application on skin every 4 hours, As needed, Perineal discomfort  -- Indication: For postpartum perineal discomfort    triamcinolone 0.1% topical ointment  -- 1 application on skin every 12 hours  -- Indication: For eczema    lanolin topical ointment  -- 1 application on skin every 6 hours, As needed, nipple soreness  -- Indication: For Nipple soreness    Prenatal Multivitamins with Vitamin B Complex, Vitamin C, Minerals and L-Methylfolate oral capsule  -- 1 cap(s) by mouth once a day   -- May discolor urine or feces.    -- Indication: For breastfeeding    norethindrone 0.35 mg oral tablet  -- 1 tab(s) by mouth once a day MDD:1 pill  -- Do not take this drug if you are pregnant.  It is very important that you take or use this exactly as directed.  Do not skip doses or discontinue unless directed by your doctor.    -- Indication: For contraception

## 2021-11-28 NOTE — OB RN PATIENT PROFILE - WILL THE PATIENT ACCEPT THE PFIZER COVID-19 VACCINE IF ELIGIBLE AND IT IS AVAILABLE?
education: N/A     Social History Main Topics    Smoking status: Current Every Day Smoker     Packs/day: 0.50     Types: Cigarettes    Smokeless tobacco: Never Used    Alcohol use No    Drug use: No    Sexual activity: Yes     Partners: Male     Other Topics Concern    None     Social History Narrative    None       SCREENINGS             PHYSICAL EXAM    (up to 7 for level 4, 8 or more for level 5)     ED Triage Vitals [09/03/18 1228]   BP Temp Temp Source Heart Rate Resp SpO2 Height Weight - Scale   130/81 98.1 °F (36.7 °C) Temporal 79 16 100 % 5' 3\" (1.6 m) 160 lb (72.6 kg)       Physical Exam   Constitutional: She is oriented to person, place, and time. She appears well-developed and well-nourished. HENT:   Head: Normocephalic and atraumatic. Right Ear: External ear normal.   Left Ear: External ear normal.   Nose: Nose normal.   Eyes: Conjunctivae are normal. Right eye exhibits no discharge. Left eye exhibits no discharge. No scleral icterus. Neck: Normal range of motion. Neck supple. Cardiovascular: Normal rate, regular rhythm, normal heart sounds and intact distal pulses. Exam reveals no gallop and no friction rub. No murmur heard. Pulmonary/Chest: Effort normal and breath sounds normal. No respiratory distress. She has no wheezes. She has no rales. Neurological: She is alert and oriented to person, place, and time. Skin: Skin is warm and dry. She is not diaphoretic. No pallor. Psychiatric: She has a normal mood and affect. Her behavior is normal. Thought content normal.   Nursing note and vitals reviewed. DIAGNOSTIC RESULTS   LABS:    Labs Reviewed - No data to display    All other labs were within normal range or not returned as of this dictation. EKG: All EKG's are interpreted by the Emergency Department Physician who either signs or Co-signs this chart in the absence of a cardiologist.  Please see their note for interpretation of EKG.       RADIOLOGY:   Non-plain film
No

## 2021-11-28 NOTE — OB PROVIDER DELIVERY SUMMARY - NSSELHIDDEN_OBGYN_ALL_OB_FT
[NS_[email or web address]_ALL_OB_FT:[id number]],[NS_DeliveryAssist1_OBGYN_ALL_OB_FT:[id number]],[NS_DeliveryAssist2_OBGYN_ALL_OB_FT:[id number]]

## 2021-11-28 NOTE — CHART NOTE - NSCHARTNOTEFT_GEN_A_CORE
/Attending:    Assumed care of this 20y/o  @39.4wks admitted for IOL 2nd to labile Bp's in triage.    Pt signed-out by day team; chart reviewed; pt seen at bedside.  PNC at [place] and fairly uncomplicated.  Pt was seen at  w/ c/o contractions and was labor was ruled out.  Pt then presented here and was noted to have labile BP's and decision made for IOL and was started on Buccal Cytotec.   Epidural in place and pt is comfortable.  Pt denies any s/sx of pre-eclampsia.  VSS, afebrile; BP: 105-154/60-97    Prenatal labs reviewed:   Blood Type: O+; GBS: neg;  HepB sAg: NR; HIV: Neg; RPR: NR    EFW: 3000gms  FHT: BL: 135 bpm  [place]: irreg ctx's  SVE: 3-4/90/-2  Memb: Intact    COVID: neg  H/H: 11.8/35.2; Plt:166; Cr.: 0.65; P/C: 0.2; AST/ALT: 22/11    A/P  -IUP @39.4wks admitted for IOL 2nd to labile BP's  -Pt's cervix more favorable and will start Pitocin for further augmentation  -Continue to monitor and reassess  -Bp's have been in the normal range, however last BP: 154/82 and now makes criteria for GHTN; continue to monitor BP's closely and for s/sx of pre-eclampsia  -Anticipate vaginal delivery at this time    Dr. Guerrero /Attending:    Assumed care of this 20y/o  @39.4wks admitted for IOL 2nd to labile Bp's in triage.    Pt signed-out by day team; chart reviewed; pt seen at bedside.  PNC at [place] and fairly uncomplicated.  Pt was seen at  w/ c/o contractions and was labor was ruled out.  Pt then presented here and was noted to have labile BP's and decision made for IOL and was started on Buccal Cytotec.   Epidural in place and pt is comfortable.  Pt denies any s/sx of pre-eclampsia.  VSS, afebrile; BP: 105-154/60-97    Prenatal labs reviewed:   Blood Type: O+; GBS: neg;  HepB sAg: NR; HIV: Neg; RPR: NR    EFW: 3000gms  FHT: BL: 135 bpm  [place]: irreg ctx's  SVE: 3-4/90/-2  Memb: SROM, clear at 8am    COVID: neg  H/H: 11.8/35.2; Plt:166; Cr.: 0.65; P/C: 0.2; AST/ALT: 22/11    A/P  -IUP @39.4wks admitted for IOL 2nd to labile BP's  -Pt's cervix more favorable and will start Pitocin for further augmentation  -Continue to monitor and reassess  -Bp's have been in the normal range, however last BP: 154/82 and now makes criteria for GHTN; continue to monitor BP's closely and for s/sx of pre-eclampsia  -Anticipate vaginal delivery at this time    Dr. Guerrero

## 2021-11-28 NOTE — OB PROVIDER LABOR PROGRESS NOTE - ASSESSMENT
Plan:  -s/p BC, JENNIFER@8a  -pt comfortable with epidural  -start pitocin  -cont EFM, toco, IVF    D/w Dr. White PGY-4  VERNELL [place] PGY-1

## 2021-11-28 NOTE — DISCHARGE NOTE OB - CARE PLAN
Principal Discharge DX:	 (normal spontaneous vaginal delivery)  Assessment and plan of treatment:	Make your follow-up appointment with your doctor in 3 days for a blood pressure check after discharge. After discharge, please stay on pelvic rest for 6 weeks, meaning no sexual intercourse, no tampons and no douching.  No driving for 2 weeks as women can loose a lot of blood during delivery and there is a possibility of being lightheaded/fainting.  No lifting objects heavier than baby for two weeks.  Expect to have vaginal bleeding/spotting for up to six weeks.  The bleeding should get lighter and more white/light brown with time.  For bleeding soaking more than a pad an hour or passing clots greater than the size of your fist, come in to the emergency department. Call your doctor with any signs and symptoms of infection such as fever, chills, nausea, or vomiting. Call your doctor if you're unable to tolerate food, increase in vaginal bleeding, or have difficulty urinating. Call your doctor if you have pain that is not relieved by your prescribed medications. Notify your doctor with any other concerns.   No heavy lifting, driving, or strenuous activity for 6 weeks.     Follow up in clinic in 3 days for a blood pressure check and in 6 weeks for your routine postpartum visit.   1 Principal Discharge DX:	 (normal spontaneous vaginal delivery)  Assessment and plan of treatment:	Make your follow-up appointment with your doctor in 3 days for a blood pressure check after discharge and 6 weeks for your routine postpartum visit. After discharge, please stay on pelvic rest for 6 weeks, meaning no sexual intercourse, no tampons and no douching.  No driving for 2 weeks as women can loose a lot of blood during delivery and there is a possibility of being lightheaded/fainting.  No lifting objects heavier than baby for two weeks.  Expect to have vaginal bleeding/spotting for up to six weeks.  The bleeding should get lighter and more white/light brown with time.  For bleeding soaking more than a pad an hour or passing clots greater than the size of your fist, come in to the emergency department. Call your doctor with any signs and symptoms of infection such as fever, chills, nausea, or vomiting. Call your doctor if you're unable to tolerate food, increase in vaginal bleeding, or have difficulty urinating. Call your doctor if you have pain that is not relieved by your prescribed medications. Notify your doctor with any other concerns.   No heavy lifting, driving, or strenuous activity for 6 weeks.     Follow up in clinic in 3 days for a blood pressure check and in 6 weeks for your routine postpartum visit.

## 2021-11-28 NOTE — OB PROVIDER H&P - HISTORY OF PRESENT ILLNESS
HPI: Pt is a 22 yo  @39+4 presenting with contractions. Pt states she has been feeling contractions irregularly for the last 2 days. They became more regular starting 7 pm tonight and increasingly painful, q5 min. Pt states she visited her regular OB at [place] earlier today and was told she was 1 cm dilated and sent home. Pt states she came here for a 2nd opinion. Elevated BPs to the 150s systolic in triage. Denies BP issues in her pregnancy.   FM endorses   LOF denies   VB denies     PN Care with Dr. Yosvany Shukla at [place].   Prenatal Issues: denies   GBS negative   EFW 3000g    OBHx:  G1 current     Gyn Hx:  denies     PMH:   denies     SHx:  denies     Psych:   denies     Social:  denies     Medications: PNV    Allergies: none     Will Accept blood transfusion? yes     Vital Signs Last 24 Hrs  T(C): 37.6 (2021 23:21), Max: 37.6 (2021 23:21)  T(F): 99.7 (2021 23:21), Max: 99.7 (2021 23:21)  HR: 94 (2021 23:59) (94 - 102)  BP: 137/90 (2021 23:59) (129/83 - 152/85)  BP(mean): --  RR: 20 (2021 23:21) (20 - 20)  SpO2: --    Physical Exam:  Gen: NAD, AOx3  CV: RR  Resp: unlabored respirations  Abd: soft, NT, ND    FHT: 130s, moderate variability, accels, no decels   Bovey: q4-5 min   EFW: 3000g  Sono: vertex, fundal

## 2021-11-28 NOTE — OB RN DELIVERY SUMMARY - BABY A: APGAR 5 MIN RESP RATE, DELIVERY
GENERAL: Awake. Alert. NAD. Well nourished.  HEENT: NC/AT, Airway patent.   LUNGS: CTAB. No wheezes or rales noted.  CARDIAC: Chest non-tender to palpation. RRR. S1 and S2 intact. No murmurs noted.  ABDOMEN: No masses noted. Soft, NT, ND, no rebound, no guarding.  EXT: No edema, no calf tenderness, distal pulses 2+ bilaterally, no deformities.  NEURO: A&Ox3. Moving all extremities. Sensation and strength intact throughout.   SKIN: Warm and dry.   PSYCH: Normal affect. (2) good, crying

## 2021-11-28 NOTE — OB RN DELIVERY SUMMARY - NS_SEPSISRSKCALC_OBGYN_ALL_OB_FT
EOS calculated successfully. EOS Risk Factor: 0.5/1000 live births ([facility] national incidence); GA=39w4d; Temp=99.7; ROM=3.75; GBS='Negative'; Antibiotics='No antibiotics or any antibiotics < 2 hrs prior to birth'

## 2021-11-28 NOTE — OB PROVIDER H&P - ATTENDING COMMENTS
ATTG on service note    Pt interviewed at the bedside.      Pt is a 22 yo P0 @ 39.4 wks admitted for IOL for early labor requesting pain mngt with multiple elevated BPs.  Pt denies severe features.  Pt with PNC at [facility] - [facility] and denies any h/o BP issues in pregnancy.  ROS - productive cough  PNC/PMH- [facility],  circumvallate placenta, Sickle Cell /Hb C Dz, Asthma, eleavted GCT - ?? if GTT done    T(C): 36.9 (21 @ 06:06), Max: 37.6 (21 @ 23:21)  HR: 85 (21 @ 06:29) (73 - 109)  BP: 129/60 (21 @ 06:09) (105/55 - 152/85)  RR: 16 (21 @ 03:00) (16 - 20)  SpO2: 99% (21 @ 06:29) (96% - 100%)    VE-1-2/70/-3  FHT-baseline 135, moderate variability, no decels, +accels  toco-q 2-3 min  EFW-3340 gms (based on u/s done on 10/28, 2340 gms (5lbs 3 oz) - 22%, HC-2%,   GBS neg    Labs   CBC-7.5/11.8/35.2/166  Creat-0.63  Coags - neg  LFTs-/  PC-0.2  MBT-O+  COVID neg  RVP -  Pos    a/p:22 yo P0 @ 39.4 wks admitted for IOL for elevated BPs in early labor requesting pain meds.  At this time, has not met criteria for GHTN or PEC.  No severe features.  NOrmal HELLP labs.  Cat I FHT.  RVP positive and symptomatic.  -admit to LnD for IOL  -early cervical exam - for Buccal cytotec/Pitocin  -epidrual for pain mngt  -cont to monitor BPs for progression to GHTN or sPEC, pt aware of need for Mag and meds if Bps worsen  -GBS neg  -low PPH risk at this time, accepts blood - consents on chart  -SCD for DVT ppx  -girl fetus  -oral contraception methods pp  -anticipate   -prenatal labs have been reviewed by this writer and no further actions needed    ALEX Fernandez

## 2021-11-28 NOTE — DISCHARGE NOTE OB - PROVIDER TOKENS
FREE:[LAST:[[facility]],PHONE:[phone]],FAX:[(   )    -],ADDRESS:[[address] [place] [address]],FOLLOWUP:[1-3 days]],FREE:[LAST:[Dr. Yosvany Shukla],PHONE:[(   )    -],FAX:[(   )    -],ADDRESS:[[facility]]

## 2021-11-29 VITALS
DIASTOLIC BLOOD PRESSURE: 79 MMHG | HEART RATE: 79 BPM | RESPIRATION RATE: 18 BRPM | OXYGEN SATURATION: 98 % | SYSTOLIC BLOOD PRESSURE: 123 MMHG | TEMPERATURE: 98 F

## 2021-11-29 LAB — T PALLIDUM AB TITR SER: NEGATIVE — SIGNIFICANT CHANGE UP

## 2021-11-29 PROCEDURE — 85384 FIBRINOGEN ACTIVITY: CPT

## 2021-11-29 PROCEDURE — 86780 TREPONEMA PALLIDUM: CPT

## 2021-11-29 PROCEDURE — 81001 URINALYSIS AUTO W/SCOPE: CPT

## 2021-11-29 PROCEDURE — 86850 RBC ANTIBODY SCREEN: CPT

## 2021-11-29 PROCEDURE — 86900 BLOOD TYPING SEROLOGIC ABO: CPT

## 2021-11-29 PROCEDURE — 59025 FETAL NON-STRESS TEST: CPT

## 2021-11-29 PROCEDURE — 59050 FETAL MONITOR W/REPORT: CPT

## 2021-11-29 PROCEDURE — 88307 TISSUE EXAM BY PATHOLOGIST: CPT

## 2021-11-29 PROCEDURE — 85025 COMPLETE CBC W/AUTO DIFF WBC: CPT

## 2021-11-29 PROCEDURE — 86901 BLOOD TYPING SEROLOGIC RH(D): CPT

## 2021-11-29 PROCEDURE — 82570 ASSAY OF URINE CREATININE: CPT

## 2021-11-29 PROCEDURE — 86769 SARS-COV-2 COVID-19 ANTIBODY: CPT

## 2021-11-29 PROCEDURE — 84550 ASSAY OF BLOOD/URIC ACID: CPT

## 2021-11-29 PROCEDURE — 80053 COMPREHEN METABOLIC PANEL: CPT

## 2021-11-29 PROCEDURE — 84156 ASSAY OF PROTEIN URINE: CPT

## 2021-11-29 PROCEDURE — G0463: CPT

## 2021-11-29 PROCEDURE — 85730 THROMBOPLASTIN TIME PARTIAL: CPT

## 2021-11-29 PROCEDURE — 83615 LACTATE (LD) (LDH) ENZYME: CPT

## 2021-11-29 PROCEDURE — 0225U NFCT DS DNA&RNA 21 SARSCOV2: CPT

## 2021-11-29 PROCEDURE — 85610 PROTHROMBIN TIME: CPT

## 2021-11-29 RX ORDER — LANOLIN
1 OINTMENT (GRAM) TOPICAL
Qty: 0 | Refills: 0 | DISCHARGE
Start: 2021-11-29

## 2021-11-29 RX ORDER — NORETHINDRONE 0.35 MG/1
1 TABLET ORAL
Qty: 90 | Refills: 0
Start: 2021-11-29 | End: 2022-02-26

## 2021-11-29 RX ORDER — AER TRAVELER 0.5 G/1
1 SOLUTION RECTAL; TOPICAL
Qty: 0 | Refills: 0 | DISCHARGE
Start: 2021-11-29

## 2021-11-29 RX ORDER — IBUPROFEN 200 MG
1 TABLET ORAL
Qty: 60 | Refills: 0
Start: 2021-11-29

## 2021-11-29 RX ORDER — ACETAMINOPHEN 500 MG
2 TABLET ORAL
Qty: 60 | Refills: 0
Start: 2021-11-29

## 2021-11-29 RX ORDER — BNT162B2 0.23 MG/2.25ML
0.3 INJECTION, SUSPENSION INTRAMUSCULAR ONCE
Refills: 0 | Status: DISCONTINUED | OUTPATIENT
Start: 2021-11-29 | End: 2021-11-29

## 2021-11-29 RX ADMIN — Medication 975 MILLIGRAM(S): at 15:15

## 2021-11-29 RX ADMIN — Medication 975 MILLIGRAM(S): at 09:30

## 2021-11-29 RX ADMIN — Medication 975 MILLIGRAM(S): at 09:00

## 2021-11-29 RX ADMIN — Medication 600 MILLIGRAM(S): at 12:52

## 2021-11-29 RX ADMIN — Medication 600 MILLIGRAM(S): at 05:43

## 2021-11-29 RX ADMIN — Medication 600 MILLIGRAM(S): at 12:22

## 2021-11-29 RX ADMIN — Medication 600 MILLIGRAM(S): at 00:04

## 2021-11-29 RX ADMIN — Medication 1 TABLET(S): at 12:23

## 2021-11-29 NOTE — PROGRESS NOTE ADULT - ASSESSMENT
A/P: 20yo PPD#1 s/p  c/b gHTN, BPs well controlled overnight, pt asymptomatic.  Patient is stable for discharge.   #gHTN  -BPs 120s-130s/70s-80s overnight  -Diagnosis of gestational HTN explained, return precautions and s/s of preeclampsia with severe features reviewed, pt voiced understanding. All questions answered.  -Rx for BP cuff and instructions given  -RTC in 3 days for BP check  #Eczema  -continue triamcinolone ointment  #PPD#1  - Pain well controlled, continue current pain regimen  - Increase ambulation, SCDs when not ambulating  - Continue regular diet  - PP contraception: norethindrone pills sent to pharmacy  - RTC in 6 weeks for routine pp visit, [facility] contact information provided  - Discharge planning     Yissel Ellington, PGY-1

## 2021-11-29 NOTE — PROGRESS NOTE ADULT - ATTENDING COMMENTS
OB/GYN Attending Note/: Agree with above, patient seen by me. Patient without complaints, no HA, no N/V, no RUQ pain, no SOB, no CP. Patient s/p  PPD#1, +Gestational HTN, and +Rhinovirus. Patient stable and plan for discharge home today. f/u in the clinic in 1 wk for BP check.  Romi Morton MD

## 2021-11-30 PROBLEM — J45.909 UNSPECIFIED ASTHMA, UNCOMPLICATED: Chronic | Status: ACTIVE | Noted: 2021-11-27

## 2021-12-07 ENCOUNTER — APPOINTMENT (OUTPATIENT)
Dept: CARDIOLOGY | Facility: CLINIC | Age: 21
End: 2021-12-07

## 2022-01-27 NOTE — OB RN PATIENT PROFILE - BREASTFEEDING PROVIDES STABLE TEMPERATURE THROUGH SKIN TO SKIN CONTACT
Instructions: This plan will send the code FBSE to the PM system.  DO NOT or CHANGE the price.
Detail Level: Simple
Price (Do Not Change): 0.00
Statement Selected

## 2022-05-26 NOTE — OB PROVIDER DELIVERY SUMMARY - NSVAGINALEXAMCERT_OBGYN_ALL_OB
The Delivery OB Provider certifies that vaginal examination and/or abdominal examination after the delivery was done and no foreign body was found.
Home

## 2022-08-12 NOTE — OB RN PATIENT PROFILE - NS_PRENATALLABSOURCEGBS1PN_OBGYN_ALL_OB
Outpatient Oncology Nutrition Consultation   Type of Consult: Follow Up  Care Location: Telephone Call    Reason for referral: from [address] 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  · S/p Jtube placement 2/7/22  · RT began 3/14/22, EOT 4/20/22  · Chemotherapy (carboplatin, taxol) 3/15/22-5/3/22  · Attempted esophagectomy 6/22/22, procedure aborted due to discovery of omental metastasis  · Chemotherapy offered, pt deciding whether or not he would like to pursue  Oncology History   Malignant neoplasm of lower third of esophagus ([facility] [address] )   1/13/2022 Initial Diagnosis    Malignant neoplasm of lower third of esophagus ([facility] [address] )     1/13/2022 Biopsy    EGD:   Esophagus, distal:  - Moderate to poorly differentiated adenocarcinoma with focal signet ring cell features  RESULTS OF IMMUNOHISTOCHEMICAL ANALYSIS FOR MISMATCH REPAIR PROTEIN LOSS     INTERPRETATION: No loss of nuclear expression of MMR proteins: Low probability of MSI-H     Note: Background non-neoplastic tissue and/or internal control with intact nuclear expression        RESULTS:  Antibody          Clone               Description                           Results  MLH1               M1                   Mismatch repair protein       Intact nuclear expression  MSH2              [id number]       Mismatch repair protein       Intact nuclear expression  MSH6              44                     Mismatch repair protein       Intact nuclear expression  PMS2              [id number]           Mismatch repair protein       Intact nuclear expression     3/14/2022 - 4/20/2022 Radiation    Treatments:  Course: C1    Plan ID Energy Fractions Dose per Fraction (cGy) Dose Correction (cGy) Total Dose Delivered (cGy) Elapsed Days   Esophags2_ReV 6X 22 / 22 180 0 3,960 29   Esophagus 6X 3 / 25 180 0 540 2   Esophagus CD 6X 3 / 3 180 0 540 2 Treatment Dates:  3/14/2022 - 4/20/2022       3/15/2022 - 5/3/2022 Chemotherapy    CARBOplatin (PARAPLATIN) IVPB (GOG AUC DOSING), 147 6 mg, Intravenous, Once, 7 of 7 cycles  Administration: 147 6 mg (3/15/2022), 196 mg (3/22/2022), 196 mg (3/29/2022), 196 mg (4/5/2022), 196 mg (4/12/2022), 200 mg (4/26/2022), 200 mg (5/3/2022)  PACLItaxel (TAXOL) chemo IVPB, 50 mg/m2 = 84 6 mg, Intravenous, Once, 7 of 7 cycles  Administration: 84 6 mg (3/15/2022), 85 8 mg (3/22/2022), 85 8 mg (3/29/2022), 85 8 mg (4/5/2022), 85 8 mg (4/12/2022), 87 mg (4/26/2022), 87 mg (5/3/2022)     6/22/2022 Surgery    Omental nodule biopsy:  A  Omentum, OMENTAL NODULE, intraoperative biopsy:  - Metastatic adenocarcinoma    Esophagectomy aborted       Past Medical & Surgical Hx: current smoker  Patient Active Problem List   Diagnosis    Smoking    Weight loss    Dysphagia    Dermatitis    Malignant neoplasm of lower third of esophagus (HCC)    Mild protein-calorie malnutrition (HCC)    Left ankle pain    Severe protein-calorie malnutrition (HCC)    Chronic pain    Cancer related pain    Palliative care encounter    Anemia due to antineoplastic chemotherapy    Encounter for geriatric assessment     Past Medical History:   Diagnosis Date    Cancer [facility])     esophageal ca    Current smoker     since 16years old    History of blood transfusion     Pneumonia     Polio     Rib fractures      Past Surgical History:   Procedure Laterality Date    EGD      ESOPHAGOGASTRODUODENOSCOPY N/A 6/22/2022    Procedure: ESOPHAGOGASTRODUODENOSCOPY (EGD);   Surgeon: Christopher Reed MD;  [facility]: [facility];  Service: Surgical Oncology    FL GUIDED CENTRAL VENOUS ACCESS DEVICE INSERTION  02/16/2022    JEJUNOSTOMY FEEDING TUBE      LAPAROTOMY N/A 6/22/2022    Procedure: LAPAROTOMY EXPLORATORY; OMENTAL BIOPSY, TAP BLOCK;  Surgeon: Christopher Reed MD;  [facility];  Service: Surgical Oncology    TUNNELED VENOUS PORT PLACEMENT Left 02/16/2022    Procedure: INSERTION VENOUS PORT (PORT-A-CATH); Surgeon: Georgie Hashimoto, MD;  Location: [facility];  Service: Surgical Oncology       Review of Medications:   Vitamins, Supplements and Herbals: No, pt denies taking supplements    Current Outpatient Medications:     acetaminophen (TYLENOL) 325 mg tablet, Take 325 mg by mouth every 6 (six) hours as needed for mild pain, Disp: , Rfl:     ascorbic acid (VITAMIN C) 500 mg tablet, Take 500 mg by mouth daily (Patient not taking: No sig reported), Disp: , Rfl:     Cholecalciferol (VITAMIN D3 PO), Take by mouth, Disp: , Rfl:     cyanocobalamin (VITAMIN B-12) 500 MCG tablet, Take 500 mcg by mouth daily, Disp: , Rfl:     Multiple Vitamins-Minerals (MULTIVITAMIN WITH MINERALS) tablet, Take 1 tablet by mouth daily, Disp: , Rfl:     ondansetron (Zofran ODT) 8 mg disintegrating tablet, Take 1 tablet (8 mg total) by mouth every 8 (eight) hours as needed for nausea or vomiting Through J tube not by mouth   (Patient not taking: No sig reported), Disp: 20 tablet, Rfl: 0    pantoprazole (PROTONIX) 40 mg tablet, Take 1 tablet (40 mg total) by mouth 2 (two) times a day (Patient not taking: No sig reported), Disp: , Rfl: 0    Most Recent Lab Results:   Lab Results   Component Value Date    WBC 8 08 07/01/2022    NEUTROABS 6 58 06/29/2022    ALT 13 06/15/2022    AST 11 06/15/2022    ALB 3 6 06/15/2022    SODIUM 137 07/02/2022    SODIUM 140 07/01/2022    K 3 9 07/02/2022    K 3 3 (L) 07/01/2022     (H) 07/02/2022    BUN 10 07/02/2022    BUN 12 07/01/2022    CREATININE 0 56 (L) 07/02/2022    CREATININE 0 58 (L) 07/01/2022    EGFR 100 07/02/2022    PHOS 3 0 06/28/2022    PHOS 2 7 06/23/2022    POCGLU 121 05/16/2022    GLUC 145 (H) 07/02/2022    HGBA1C 4 9 06/15/2022    CALCIUM 8 7 07/02/2022    MG 2 2 06/29/2022       Anthropometric Measurements:   Height: 70"  Ht Readings from Last 1 Encounters:   08/01/22 5' 10" (1 778 m)     Wt Readings from Last 27 Encounters: 08/01/22 58 5 kg (129 lb)   07/15/22 59 7 kg (131 lb 9 6 oz)   06/22/22 64 9 kg (143 lb)   06/10/22 64 9 kg (143 lb)   06/07/22 63 kg (139 lb)   06/06/22 63 5 kg (140 lb)   06/02/22 64 4 kg (142 lb)   06/01/22 64 2 kg (141 lb 8 oz)   05/19/22 62 8 kg (138 lb 6 4 oz)   05/03/22 61 2 kg (134 lb 14 7 oz)   05/02/22 61 2 kg (135 lb)   04/26/22 59 7 kg (131 lb 9 8 oz)   04/12/22 59 kg (130 lb)   04/12/22 59 1 kg (130 lb 6 4 oz)   04/11/22 59 kg (130 lb)   04/05/22 60 2 kg (132 lb 12 8 oz)   03/29/22 60 5 kg (133 lb 6 4 oz)   03/25/22 58 1 kg (128 lb)   03/22/22 57 5 kg (126 lb 12 8 oz)   03/15/22 55 4 kg (122 lb 3 2 oz)   03/07/22 55 3 kg (122 lb)   02/17/22 60 3 kg (133 lb)   02/16/22 58 8 kg (129 lb 9 6 oz)   02/14/22 61 2 kg (135 lb)   02/06/22 59 5 kg (131 lb 2 8 oz)   02/03/22 66 7 kg (147 lb)   01/13/22 67 kg (147 lb 11 3 oz)     Weight History:    Usual Weight: 175#   Varian: (3/14/22) 124#, (3/17/22) 120 5#, (3/21/22) 126#, (3/22/22) 127#, (3/24/22) 128#, (3/28/22) 131#, (3/29/22) 134#, (3/30/22) 134#, (3/31/22) 134#, (4/4/22) 133#, (4/5/22) 133#, (4/7/22) 132#, (4/11/22) 130#, (4/13/22) 130#, (4/14/22) 130 5#, (4/18/22) 130#       Home Scale: (5/11/22) 138#, (5/20/22) 139#, (7/12/22) 135#, (7/28/22)129#, (8/12/22) 130#    Oncology Nutrition-Anthropometrics    Flowsheet Row Nutrition from 8/12/2022 in [address] [place] [address] Dietitian [facility] Nutrition from 7/28/2022 in [place] [address] Oncology Dietitian [facility]   Patient age (years): 69 years 69 years   Patient (male) height (in): 79 in 79 in   Current weight (lbs): 129 lbs 131 6 lbs   Current weight to be used for anthropometric calculations (kg) 58 6 kg 59 8 kg   BMI: 18 5 18 9   IBW male 166 lb 166 lb   IBW (kg) male 75 5 kg 75 5 kg   IBW % (male) 77 7 % 79 3 %   Adjusted BW (male): 156 8 lbs 157 4 lbs   Adjusted BW in kg (male): 71 3 kg 71 5 kg   % weight change after 1 month: -2 % -8 %   Weight change after 1 month (lbs) -2 6 lbs -11 4 lbs   % weight change after 3 months: -4 4 % 1 2 %   Weight change after 3 months (lbs) -5 9 lbs 1 6 lbs   % weight change after 6 months: -12 2 % -10 9 %   Weight change after 6 months (lbs) -18 lbs -16 1 lbs          Nutrition-Focused Physical Findings: n/a due to telephone call    Food/Nutrition-Related History & Client/Social History:    Current Nutrition Impact Symptoms:  [] Nausea -has zofran  [] Reduced Appetite  [] Acid Reflux    [] Vomiting  [x] Unintended Wt Loss- significant x6 months  [] Malabsorption    [] Diarrhea  [] Unintended Wt Gain  [] Dumping Syndrome    [] Constipation  [] Thick Mucous/Secretions  [] Abdominal Pain    [] Dysgeusia (Altered Taste)  [x] Xerostomia (Dry Mouth)  [] Gas    [] Dysosmia (Altered Smell)  [] Thrush  [] Difficulty Chewing    [] Oral Mucositis (Sore Mouth)  [] Fatigue  [x] Hyperglycemia: BG nonfasting 145mg/dL on 7/2/22  [] Odynophagia  [] Esophagitis  [] Other: hypokalemia 7/1/22   [] Dysphagia  [] Early Satiety  [] No Problems Eating      Food Allergies & Intolerances: no    Current Diet: Regular Diet, No Restrictions  Current Nutrition Intake: Increased since last visit    Appetite: Good   Nutrition Route: PO  Oral Care: brushes BID  Activity level: Uses carney to walk  Energy is increasing, continues to feel stronger        24 Hr Diet Recall:   Breakfast: cereal with milk and peaches OR eggs with toast OR pancakes   Lunch: Tuna sandwich   Dinner: pasta with shrimp OR raviolis OR ham and potatoes  Snack: pudding or sliced fruit OR canned peaches     Beverages: coffee (8oz x1), water (8oz x2-3), OJ (8oz x0-1)   Nutrition supplement: Has Orgain (250 kcal, 16g pro) but has not been consuming regularly    EN Recall:  Flushing tube QD  Has not used jtube for nutrition since mid june    Oncology Nutrition-Estimated Needs    Flowsheet Row Nutrition from 8/12/2022 in [address] [place] [address] Nutrition from 7/28/2022 in [address] [place] [address]   Weight type used Actual weight Actual weight   Weight in kilograms (kg) used for estimated needs 58 6 kg 59 8 kg   Energy needs formula:  35-40 kcal/kg 35-40 kcal/kg   Energy needs based on 35 kcal/k kcal 2094 kcal   Energy needs based on 40 kcal/k kcal 2393 kcal   Protein needs formula: 1 5-2 g/kg 1 5-2 g/kg   Protein needs based on 1 5 g/kg 88 g 90 g   Protein needs based on 2 g/kg 117 g 120 g   Fluid needs formula: 30-35 mL/kg 30-35 mL/kg   Fluid needs based on 30 mL/kg 1758 mL 1800 mL   Fluid needs in ounces 59 oz 61 oz   Fluid needs based on 35 mL/kg 2051 mL 2100 mL   Fluid needs in ounces 69 oz 71 oz           Discussion & Intervention:   Laci Gordon was evaluated today for an RD follow up regarding wt loss, Esophageal Cancer and enteral nutrition  Laci Gordon has completed has completed chemo/RT for EC, and recently underwent attempted esophagectomy  Procedure was aborted due to discovery of metastatic disease  Possibly resuming chemo in the near future  In regards to his nutrition, Laci Gordon states that he is not having any difficulty eating at this time  Intakes are suboptimal and he has lost a significant amount of weight over the past 6 months  Suggestions to increase calorie and protein intake include: eating smaller/more frequent meals, including high calorie foods in diet (cheese, whole milk, peanut butter, heavy cream), including high protein foods in diet (eggs, chicken, fish, beans/legumes, nuts/nut butters), eating when feeling most hungry, and keeping non perishable foods nearby to snack on   Reviewed choosing liquids with calories: whole milk, Fairlife milk (higher protein/lactose-free milk), chocolate milk, 100% fruit juice, diluted juice, bone broth (higher protein broth), creamy soups, sports drinks (Gatorade, Poweraide, Pedialyte, etc ), [place] ice, popsicles, milkshakes, smoothies, oral nutrition supplements (Ensure, Boost, Orgain etc ), gelatin/Jello, etc      Moving forward, Laci Gordon was encouraged to increase kcal, protein, and fluid intakes  Materials Provided: not applicable   All questions and concerns addressed during todays visit  Rut Gallardo has RD contact information  Nutrition Diagnosis:    Inadequate Energy Intake related to physiological causes, disease state and treatment related issues as evidenced by food recall, wt loss and discussion with pt and/or family   Increased Nutrient Needs (kcal & pro) related to increased demand for nutrients and disease state as evidenced by recovering from cancer tx   Increased Nutrient Needs related to increased demand for nutrients as evidenced by desire to gain weight  Monitoring & Evaluation:   Goals:  · pt to meet >/=75% estimated nutrition needs daily  · weight gain of 1-2# per week  · increase calorie, protein, fluid intake    · Progress Towards Goals: Progressing and Not Met    Nutrition Rx & Recommendations:  · Diet: High Calorie, High Protein (for high calorie foods see pages 52-53, and for high protein foods see pages 49-51 in your Eating Hints book)  · Incorporate physical activity as able/allowed  · Follow proper oral care; Try baking soda/salt water rinse recipe (mix 3/4 tsp salt + 1 tsp baking soda + 1 qt water; rinse with plain water after using) in Eating Hints book (pg 18)  Brush your teeth before/after meals & before bed  · Weigh yourself regularly  If you notice weight loss, make an effort to increase your daily food/calorie intake  If you continue to notice loss after these efforts, reach out to your dietitian to establish a plan to stabilize weight  · Your syringes are each 60 mL or 2 fl oz  Always flush your feeding tube with 60 mL room-temp water 1-2 times daily while feeding tube is not in use  Ways to increase calorie and protein intake:    Eat when feeling most hungry      Choose foods that are easy to eat: yogurt, oatmeal, eggs, soup, cereal    Keep non perishable snacks nearby   5-6 small meals/snacks daily   Protein at all meals/snacks: eggs, chicken, fish, beans, nuts/nut butters    Add high calorie foods to meals: cheese, milk, olive oil, avocado, butter, peanut butter    Choose liquids with calories: whole milk, Fairlife milk (higher protein/lactose-free milk), chocolate milk, 100% fruit juice, diluted juice, bone broth (higher protein broth), creamy soups, sports drinks (Gatorade, Poweraide, Pedialyte, etc ), [place] ice, popsicles, milkshakes, smoothies, oral nutrition supplements (Ensure, Boost, Orgain etc ), gelatin/Jello, etc   o Use oral nutrition supplements to make homemade smoothies or milkshakes  o Choose oral nutrition supplements with >300 calories per serving        Follow Up Plan: 9/2/22 phone follow up    Recommend Referral to Other Providers: none at this time negative

## 2022-09-19 ENCOUNTER — EMERGENCY (EMERGENCY)
Facility: HOSPITAL | Age: 22
LOS: 0 days | Discharge: HOME | End: 2022-09-19

## 2022-09-19 VITALS
SYSTOLIC BLOOD PRESSURE: 118 MMHG | DIASTOLIC BLOOD PRESSURE: 69 MMHG | OXYGEN SATURATION: 100 % | WEIGHT: 104.94 LBS | RESPIRATION RATE: 19 BRPM | HEIGHT: 61 IN | TEMPERATURE: 98 F | HEART RATE: 98 BPM

## 2022-09-19 VITALS
HEART RATE: 70 BPM | RESPIRATION RATE: 19 BRPM | TEMPERATURE: 98 F | OXYGEN SATURATION: 99 % | DIASTOLIC BLOOD PRESSURE: 69 MMHG | SYSTOLIC BLOOD PRESSURE: 105 MMHG

## 2022-09-19 DIAGNOSIS — Z53.21 PROCEDURE AND TREATMENT NOT CARRIED OUT DUE TO PATIENT LEAVING PRIOR TO BEING SEEN BY HEALTH CARE PROVIDER: ICD-10-CM

## 2022-09-19 DIAGNOSIS — Z3A.01 LESS THAN 8 WEEKS GESTATION OF PREGNANCY: ICD-10-CM

## 2022-09-19 DIAGNOSIS — O20.9 HEMORRHAGE IN EARLY PREGNANCY, UNSPECIFIED: ICD-10-CM

## 2022-09-19 PROCEDURE — L9991: CPT

## 2022-09-19 NOTE — ED ADULT NURSE REASSESSMENT NOTE - NS ED NURSE REASSESS COMMENT FT1
Still complains of vaginal bleeding. Intermittent abdominal pain, able to tolerate. VS stable at this time. Will continue to monitor.
Still complains of lower abdominal pain. VS stable at this time. Will continue to monitor.

## 2022-09-19 NOTE — ED ADULT TRIAGE NOTE - CHIEF COMPLAINT QUOTE
Came in for vaginal bleeding started this morning. Also complains of lower abdominal cramping. LMP 7/22/22, confirmed pregnancy test 9/1/22. Denies dizziness, n/v or shortness of breath.

## 2022-09-20 ENCOUNTER — EMERGENCY (EMERGENCY)
Facility: HOSPITAL | Age: 22
LOS: 0 days | Discharge: HOME | End: 2022-09-20
Attending: EMERGENCY MEDICINE

## 2022-09-20 VITALS
TEMPERATURE: 98 F | DIASTOLIC BLOOD PRESSURE: 66 MMHG | OXYGEN SATURATION: 100 % | RESPIRATION RATE: 18 BRPM | SYSTOLIC BLOOD PRESSURE: 97 MMHG | HEART RATE: 69 BPM

## 2022-09-20 VITALS
DIASTOLIC BLOOD PRESSURE: 73 MMHG | RESPIRATION RATE: 16 BRPM | HEIGHT: 61 IN | WEIGHT: 106.04 LBS | HEART RATE: 92 BPM | TEMPERATURE: 98 F | SYSTOLIC BLOOD PRESSURE: 112 MMHG | OXYGEN SATURATION: 98 %

## 2022-09-20 DIAGNOSIS — O20.9 HEMORRHAGE IN EARLY PREGNANCY, UNSPECIFIED: ICD-10-CM

## 2022-09-20 DIAGNOSIS — J45.909 UNSPECIFIED ASTHMA, UNCOMPLICATED: ICD-10-CM

## 2022-09-20 DIAGNOSIS — O99.511 DISEASES OF THE RESPIRATORY SYSTEM COMPLICATING PREGNANCY, FIRST TRIMESTER: ICD-10-CM

## 2022-09-20 DIAGNOSIS — Z3A.08 8 WEEKS GESTATION OF PREGNANCY: ICD-10-CM

## 2022-09-20 DIAGNOSIS — Z91.013 ALLERGY TO SEAFOOD: ICD-10-CM

## 2022-09-20 DIAGNOSIS — R10.30 LOWER ABDOMINAL PAIN, UNSPECIFIED: ICD-10-CM

## 2022-09-20 LAB
ALBUMIN SERPL ELPH-MCNC: 3.5 G/DL — SIGNIFICANT CHANGE UP (ref 3.3–5)
ALP SERPL-CCNC: 48 U/L — SIGNIFICANT CHANGE UP (ref 40–120)
ALT FLD-CCNC: 17 U/L — SIGNIFICANT CHANGE UP (ref 12–78)
ANION GAP SERPL CALC-SCNC: 5 MMOL/L — SIGNIFICANT CHANGE UP (ref 5–17)
AST SERPL-CCNC: 16 U/L — SIGNIFICANT CHANGE UP (ref 15–37)
BASOPHILS # BLD AUTO: 0.02 K/UL — SIGNIFICANT CHANGE UP (ref 0–0.2)
BASOPHILS NFR BLD AUTO: 0.4 % — SIGNIFICANT CHANGE UP (ref 0–2)
BILIRUB SERPL-MCNC: 0.5 MG/DL — SIGNIFICANT CHANGE UP (ref 0.2–1.2)
BLD GP AB SCN SERPL QL: SIGNIFICANT CHANGE UP
BUN SERPL-MCNC: 8 MG/DL — SIGNIFICANT CHANGE UP (ref 7–23)
CALCIUM SERPL-MCNC: 8.7 MG/DL — SIGNIFICANT CHANGE UP (ref 8.5–10.1)
CHLORIDE SERPL-SCNC: 108 MMOL/L — SIGNIFICANT CHANGE UP (ref 96–108)
CO2 SERPL-SCNC: 28 MMOL/L — SIGNIFICANT CHANGE UP (ref 22–31)
CREAT SERPL-MCNC: 0.72 MG/DL — SIGNIFICANT CHANGE UP (ref 0.5–1.3)
EGFR: 122 ML/MIN/1.73M2 — SIGNIFICANT CHANGE UP
EOSINOPHIL # BLD AUTO: 0.51 K/UL — HIGH (ref 0–0.5)
EOSINOPHIL NFR BLD AUTO: 9.4 % — HIGH (ref 0–6)
GLUCOSE SERPL-MCNC: 81 MG/DL — SIGNIFICANT CHANGE UP (ref 70–99)
HCG SERPL-ACNC: 2117 MIU/ML — HIGH
HCT VFR BLD CALC: 30.7 % — LOW (ref 34.5–45)
HGB BLD-MCNC: 10.7 G/DL — LOW (ref 11.5–15.5)
IMM GRANULOCYTES NFR BLD AUTO: 0.4 % — SIGNIFICANT CHANGE UP (ref 0–0.9)
LYMPHOCYTES # BLD AUTO: 1.31 K/UL — SIGNIFICANT CHANGE UP (ref 1–3.3)
LYMPHOCYTES # BLD AUTO: 24.1 % — SIGNIFICANT CHANGE UP (ref 13–44)
MCHC RBC-ENTMCNC: 30.3 PG — SIGNIFICANT CHANGE UP (ref 27–34)
MCHC RBC-ENTMCNC: 34.9 G/DL — SIGNIFICANT CHANGE UP (ref 32–36)
MCV RBC AUTO: 87 FL — SIGNIFICANT CHANGE UP (ref 80–100)
MONOCYTES # BLD AUTO: 0.85 K/UL — SIGNIFICANT CHANGE UP (ref 0–0.9)
MONOCYTES NFR BLD AUTO: 15.6 % — HIGH (ref 2–14)
NEUTROPHILS # BLD AUTO: 2.73 K/UL — SIGNIFICANT CHANGE UP (ref 1.8–7.4)
NEUTROPHILS NFR BLD AUTO: 50.1 % — SIGNIFICANT CHANGE UP (ref 43–77)
NRBC # BLD: 0 /100 WBCS — SIGNIFICANT CHANGE UP (ref 0–0)
PLATELET # BLD AUTO: 266 K/UL — SIGNIFICANT CHANGE UP (ref 150–400)
POTASSIUM SERPL-MCNC: 3.5 MMOL/L — SIGNIFICANT CHANGE UP (ref 3.5–5.3)
POTASSIUM SERPL-SCNC: 3.5 MMOL/L — SIGNIFICANT CHANGE UP (ref 3.5–5.3)
PROT SERPL-MCNC: 6.9 GM/DL — SIGNIFICANT CHANGE UP (ref 6–8.3)
RBC # BLD: 3.53 M/UL — LOW (ref 3.8–5.2)
RBC # FLD: 12.5 % — SIGNIFICANT CHANGE UP (ref 10.3–14.5)
SODIUM SERPL-SCNC: 141 MMOL/L — SIGNIFICANT CHANGE UP (ref 135–145)
WBC # BLD: 5.44 K/UL — SIGNIFICANT CHANGE UP (ref 3.8–10.5)
WBC # FLD AUTO: 5.44 K/UL — SIGNIFICANT CHANGE UP (ref 3.8–10.5)

## 2022-09-20 PROCEDURE — 99285 EMERGENCY DEPT VISIT HI MDM: CPT

## 2022-09-20 PROCEDURE — 76856 US EXAM PELVIC COMPLETE: CPT | Mod: 26

## 2022-09-20 RX ORDER — SODIUM CHLORIDE 9 MG/ML
1000 INJECTION INTRAMUSCULAR; INTRAVENOUS; SUBCUTANEOUS ONCE
Refills: 0 | Status: COMPLETED | OUTPATIENT
Start: 2022-09-20 | End: 2022-09-20

## 2022-09-20 RX ORDER — ACETAMINOPHEN 500 MG
650 TABLET ORAL ONCE
Refills: 0 | Status: COMPLETED | OUTPATIENT
Start: 2022-09-20 | End: 2022-09-20

## 2022-09-20 RX ADMIN — SODIUM CHLORIDE 1000 MILLILITER(S): 9 INJECTION INTRAMUSCULAR; INTRAVENOUS; SUBCUTANEOUS at 15:47

## 2022-09-20 RX ADMIN — Medication 650 MILLIGRAM(S): at 15:45

## 2022-09-20 NOTE — ED PROVIDER NOTE - OBJECTIVE STATEMENT
23 y/o female with asthma,  8 weeks pregnant presents with vaginal bleeding since yesterday. Pt reports heavy bleeding with clots, using about 8 pads today. Pt also reports lower abdominal cramping .Denies fever, vomiting, urinary symptoms. Pt had US done 1 week ago which was normal. LMP 2022

## 2022-09-20 NOTE — ED PROVIDER NOTE - PHYSICAL EXAMINATION
GEN: Awake, alert, interactive, NAD.  HEAD AND NECK: NC/AT. Airway patent. Neck supple.   EYES:  Clear b/l.   ENT: Moist mucus membranes.   CARDIAC: Regular rate, regular rhythm. No evident pedal edema.    RESP/CHEST: Normal respiratory effort with no use of accessory muscles or retractions. Clear throughout on auscultation.  ABD: soft, non-distended, mild lower abdominal tenderness. No rebound, no guarding.   EXTREMITIES: Moving all extremities with no apparent deformities.   SKIN: Warm, dry, intact normal color. No rash.   NEURO: AOx3, CN II-XII grossly intact, no focal deficits.   PSYCH: Appropriate mood and affect. GEN: Awake, alert, interactive, NAD.  HEAD AND NECK: NC/AT. Airway patent. Neck supple.   EYES:  Clear b/l.   ENT: Moist mucus membranes.   CARDIAC: Regular rate, regular rhythm. No evident pedal edema.    RESP/CHEST: Normal respiratory effort with no use of accessory muscles or retractions. Clear throughout on auscultation.  ABD: soft, non-distended, mild lower abdominal tenderness. No rebound, no guarding.   PELVIC: chaperoned by nurse Sakina Bimanual: (+) cervical os closed. (+) mild bleeding, (-) uterine/ adnexal tenderness.   EXTREMITIES: Moving all extremities with no apparent deformities.   SKIN: Warm, dry, intact normal color. No rash.   NEURO: AOx3, CN II-XII grossly intact, no focal deficits.   PSYCH: Appropriate mood and affect.

## 2022-09-20 NOTE — ED PROVIDER NOTE - NSFOLLOWUPINSTRUCTIONS_ED_ALL_ED_FT
RETURN HERE  IN 2 DAYS FOR REPEAT BETA HCG. RETURN SOONER IF INCREASE BLEEDING, PAIN, OR IF SYMPTOMS WORSEN. Rest, drink plenty of fluids.  Advance activity as tolerated.  Continue all previously prescribed medications as directed.  Follow up with your OB/GYN in 48-72 hours- bring copies of your results.  Return to the ER for worsening or persistent symptoms, and/or ANY NEW OR CONCERNING SYMPTOMS. If you have issues obtaining follow up, please call: [phone] to obtain a doctor or specialist who takes your insurance in your area.  You may call [phone] to make an appointment with the internal medicine clinic.

## 2022-09-20 NOTE — ED ADULT NURSE NOTE - CAS ELECT INFOMATION PROVIDED
[facility]    Discharge Summary  Hospitalist    Date of Admission:  2/24/2018  Date of Discharge:  2/28/2018  Discharging Provider: Marixa Trujillo MD  Date of Service (when I saw the patient): 02/28/18    Discharge Diagnoses      1.   Acute mid sigmoid diverticulitis with possible abscess formation without perforation      2.  Abdominal pain secondary to #1: improving      3.  Hypertensive urgency: Blood pressure improved      4.  Leukocytosis with left shift, normal lactic acid: Improved with antibiotics.      History of Present Illness   Lennox Acuna is an 65 year old male who presented with no significant medical history was admitted for acute mid sigmoid diverticulitis with abscess formation.  Please see H&P and his hospital course for details.    Hospital Course   Brief summary of admission assessment: Lennox Acuna is a 65 year old  male with no significant medical problems  who presents with abdominal pain. ED evaluation revealed CT evidence of acute diverticulitis. Patient's presentation is consistent with acute complicated diverticulitis and will be admitted for further inpatient management to care of Hospitalist service.        Admission diagnoses:    1.   Acute mid sigmoid diverticulitis with possible abscess formation without perforation: Patient was started on IV Zosyn.  Colorectal surgery was consulted and recommended interventional radiology consult for drainage of the abscess.  IR was consulted and patient had aspiration of 20 cc purulent fluid on 2/26.  During his hospital course he had gradual improvement of his symptoms.  He is vitals and labs improved.  Abdominal pain also improved significantly.  --He received IV Zosyn for 4 days and then his antibiotic was switched to oral Augmentin on 2/27 per colorectal surgery.  He was discharged on Augmentin for 10 more days.  His diet was advanced to low fiber. Colorectal surgery recommended outpatient CT sinogram  in 1-2 weeks and colonoscopy in 6-8 weeks and the follow-up will be scheduled by colorectal surgery office.  Patient was discharged home on oral Augmentin.       2.  Abdominal pain secondary to #1: Abdominal pain improved significantly.      3.  Hypertensive urgency with blood pressure over 200 when he first presented: BP improved.       4.  Leukocytosis with left shift, normal lactic acid: Improved with antibiotics.    Patient remained stable during hospital course.  He was discharged home in stable condition.    Significant Results and Procedures   Results for orders placed or performed during the hospital encounter of 02/24/18   CT Abdomen Pelvis w Contrast    Narrative    CT ABDOMEN PELVIS W CONTRAST   2/25/2018 12:52 AM     HISTORY: Right lower quadrant pain.    TECHNIQUE: 95mL Isovue-370 IV were administered. After contrast  administration, volumetric helical sections were acquired from the  lung bases to the ischial tuberosities. Coronal images were also  reconstructed. Radiation dose for this scan was reduced using  automated exposure control, adjustment of the mA and/or kV according  to patient size, or iterative reconstruction technique.    COMPARISON: None.     FINDINGS: Scattered colonic diverticulosis. Focal bowel wall  thickening in the mid sigmoid colon is consistent with acute  diverticulitis. There is a small peripherally enhancing fluid  collection within the bowel wall of the sigmoid colon (series 2, image  67) measuring up to 2.3 cm likely representing a small intramural  abscess. Adjacent to the thickened sigmoid colon, there is an  irregular area of fluid and fat stranding measuring approximately 8 x  5.7 cm, suspicious for a developing abscess. No air bubbles are seen  within these areas of perisigmoid fluid.    No bowel obstruction. The appendix is well seen and is unremarkable.  Small fat-containing paraumbilical hernia. Mild atherosclerotic  aortoiliac calcification. The liver, gallbladder,  spleen, adrenal  glands, pancreas, and kidneys are unremarkable. No hydronephrosis.  Small hiatal hernia. The visualized lung bases are clear.      Impression    IMPRESSION:   1. Acute diverticulitis.  2. Ill-defined fluid and stranding adjacent to the thickened sigmoid  colon is suspicious for a developing abscess.  3. A smaller area of fluid density within the bowel wall of the  thickened sigmoid colon likely represents a small intramural abscess.    JHON ALBARRAN MD   CT Abdomen Peritonium Abscess Drainage    Narrative    CT GUIDED ABDOMINAL ABSCESS DRAIN  2/26/2018 11:52 AM    MEDICATIONS: 10 mL 1% lidocaine, 2 mg Versed IV, 100 mcg fentanyl IV.  Throughout the procedure, the patient was monitored by a radiology  nurse for cardiac rhythm and oxygen saturation which remained stable.  Total sedation time was 30 minutes.    CONTRAST: None.    COMPLICATIONS: None.    HISTORY: 65-year-old male with abdominal/pelvic abscess.    PROCEDURE AND FINDINGS:  Following a discussion of the risks,  benefits, indications and alternatives to treatment, appropriate  informed consent was obtained.  The patient was brought to the  interventional radiology suite and placed supine on the table. The  left lower quadrant was prepped and draped in a sterile fashion.  A  time out was performed per universal protocol policy to ensure correct  patient, site and procedure to be performed. Radiation dose for this  scan was reduced using automated exposure control, adjustment of the  mA and/or kV according to patient size, or iterative reconstruction  technique.     5 mm unenhanced images were obtained through the region of the  abdomen/pelvis, images showed a fluid collection in the mid  abdomen/pelvis. A suitable route for percutaneous drainage was then  chosen. The overlying skin was prepped and draped in a sterile manner.  1% lidocaine was used for local anesthesia. Under CT guidance, 5  Armenian Yueh needle was advanced to the abscess.  The catheter was  advanced off of the needle. 5 mL of purulent fluid was aspirated and  sent to lab for culture. A 0.035 wire was advanced through the  catheter and exchange was made for a 10 Romanian pigtail drain. The  pigtail was locked and connected to JACKSON bulb. The drain was sutured in  with silk suture. A sterile dressing was applied.     Throughout the procedure, the patient was monitored by a radiology  nurse for cardiac rhythm and oxygen saturation which remained stable.  Total sedation time was 14 minutes. The patient tolerated the  procedure well and left interventional radiology in stable condition.      Impression    IMPRESSION: CT-guided abdominal/pelvic abscess drain placement.    RAFAEL SNELL DO         Pending Results   None    Code Status   Full Code       Primary Care Physician   [facility]    Discharge Disposition   Discharged to home  Condition at discharge: Stable    Consultations This Hospital Stay   COLORECTAL SURGERY IP CONSULT    Time Spent on this Encounter   IMarixa MD, personally saw the patient today and spent greater than 30 minutes discharging this patient.    Discharge Orders     Reason for your hospital stay   Acute diverticultis     Follow-up and recommended labs and tests    Follow up with primary care provider, [facility], within 7 days   Follow up with colorectal surgery as scheduled     Activity   Your activity upon discharge: activity as tolerated     Discharge Instructions   Please call the clinic if:  - fever greater than 101 degrees  - any signs of infection (increasing redness, swelling, tenderness, warmth, change in appearance, increased drainage)  - blood in urine or stool  - severe pain that is not relieved by medicine, rest, or ice    Or as questions or concerns arise. Contact clinic at [phone]    Call 911 if you feel you are having a medical emergency     Follow-up and recommended labs and tests    [facility] will arrange for CT sinogram in 1-2 weeks as well as colonoscopy in 6-8 weeks.   If you have further questions regarding scheduling, please call [phone].     Full Code     Diet   Follow this diet upon discharge: Orders Placed This Encounter     Low Fiber Diet       Discharge Medications   Current Discharge Medication List      START taking these medications    Details   amoxicillin-clavulanate (AUGMENTIN XR) 1000-62.5 MG per 12 hr tablet Take 2 tablets by mouth every 12 hours for 10 days  Qty: 20 tablet, Refills: 0    Associated Diagnoses: Acute diverticulitis         CONTINUE these medications which have NOT CHANGED    Details   ACETAMINOPHEN PO Take 1,000 mg by mouth every 6 hours as needed            # Pain Assessment:   Current Pain Score 2/28/2018 2/28/2018 2/27/2018   Patient currently in pain? denies denies yes   Pain score (0-10) 1 - 2   Pain location - - -   Pain descriptors - - -   Lennox henry pain level was assessed and he currently denies pain.      Allergies   Allergies   Allergen Reactions     Nkda [No Known Drug Allergies]      Data   Most Recent 3 CBC's:  Recent Labs   Lab Test  02/28/18   0746  02/27/18   0807  02/26/18   0659   WBC  9.4  9.5  13.1*   HGB  14.1  12.6*  12.7*   MCV  91  93  91   PLT  468*  398  380      Most Recent 3 BMP's:  Recent Labs   Lab Test  02/27/18   0807  02/26/18   0659  02/24/18   2353   NA  136  134  138   POTASSIUM  4.4  4.4  3.9   CHLORIDE  105  101  105   CO2  26  29  25   BUN  8  8  18   CR  1.03  1.06  0.96   ANIONGAP  5  4  8   LEYDI  9.0  8.9  9.1   GLC  109*  119*  122*     Most Recent 2 LFT's:  Recent Labs   Lab Test  02/24/18   2353  06/02/14   1814   AST  16  14   ALT  23  14   ALKPHOS  169*  112   BILITOTAL  0.2  0.4     Most Recent INR's and Anticoagulation Dosing History:  Anticoagulation Dose History     Recent Dosing and Labs Latest Ref Rng & Units 2/26/2018    INR 0.86 - 1.14 1.03        Most Recent 3 Troponin's:No lab  results found.  Most Recent Cholesterol Panel:No lab results found.  Most Recent 6 Bacteria Isolates From Any Culture (See EPIC Reports for Culture Details):  Recent Labs   Lab Test  02/26/18   1140   CULT  Moderate growth  Non lactose fermenting gram negative rods  *  Moderate growth  Escherichia coli  Susceptibility testing in progress  *  On day 1, isolated in broth only:  Gram positive cocci in chains  *  Culture in progress  Culture negative monitoring continues     Most Recent TSH, T4 and A1c Labs:  Recent Labs   Lab Test 10/03/17   A1C  5.8        DC instructions

## 2022-09-20 NOTE — ED ADULT NURSE NOTE - OBJECTIVE STATEMENT
Patient A&OX3, breathing even and unlabored on room air, no acute respiratory distress noted. No pertinent pmhx presented to ED c/o 8 weeks pregnant with vaginal bleeding with clots, lower abdominal pain. LMP 7/22/22. Labs drawn and sent and medication given a sordered.

## 2022-09-20 NOTE — ED PROVIDER NOTE - PATIENT PORTAL LINK FT
You can access the FollowMyHealth Patient Portal offered by [facility] by registering at the following website: [email or web address]. By joining [facility]’s FollowMyHealth portal, you will also be able to view your health information using other applications (apps) compatible with our system.

## 2022-09-20 NOTE — ED PROVIDER NOTE - ATTENDING APP SHARED VISIT CONTRIBUTION OF CARE
I have personally seen and examined this pt I have fully participated in the care this pt  I have made amendments to the documentation where appropriate and otherwise hx, exam, and plan as documented by the ACP.

## 2022-09-20 NOTE — ED PROVIDER NOTE - NS ED ROS FT
CONSTITUTIONAL: No fever, no chills, no fatigue  EYES: No visual changes  ENT: No ear pain, no sore throat  CARDIOVASCULAR: No chest pain, no palpitations  RESPIRATORY: No cough, no SOB  GI:  no nausea, no vomiting, no constipation, no diarrhea  GENITOURINARY: No dysuria, no frequency, no hematuria  MUSKULOSKELETAL: No backpain, no joint pain, no myalgias  SKIN: No rash  NEURO: No headache    ALL OTHER SYSTEMS NEGATIVE.

## 2022-09-20 NOTE — ED PROVIDER NOTE - CLINICAL SUMMARY MEDICAL DECISION MAKING FREE TEXT BOX
23 y/o female with asthma presents with vaginal bleeding since yesterday with lower abdominal pain. Will check labs, UA, US ordered to assess for pregnancy. IVf and tylenol ordered. 21 y/o female with asthma presents with vaginal bleeding since yesterday with lower abdominal pain. Will check labs, UA, US ordered to assess for pregnancy. IVf and tylenol ordered.  labs reviewed. Rh positive. HCG 2117  US Heterogeneous endometrium without evidence of intrauterine gestational   sac. In the presence of a positive pregnancy test with bleeding, this may   represent  in progress, early pregnancy too early to be seen or   ectopic pregnancy. Please correlate with the beta hCG level.    Pt made aware of the results. Pt advised to return in 2 days for repeat beta hcg. Pt reassessed and NAD. reports feeling better and bleeding has improved    Strict return precautions given.

## 2023-01-12 NOTE — DISCHARGE NOTE OB - PROVIDER RX CONTACT NUMBER
Reason for visit: Initial lactation consult     Breastfeeding infant getting enough    Education:  Discussed Lactation services and [employer] booklet given and the pages on breastfeeding/pumping/storing breastmilk/feeding log explained thoroughly. Mom verbalizes understanding. Encouraged to call lactation with any questions or concerns.    Consultation Time: Extented  -Oli Dennis, BSN, RN, CLC     [phone]

## 2023-08-14 ENCOUNTER — EMERGENCY (EMERGENCY)
Facility: HOSPITAL | Age: 23
LOS: 1 days | Discharge: HOME | End: 2023-08-14
Attending: EMERGENCY MEDICINE | Admitting: EMERGENCY MEDICINE
Payer: COMMERCIAL

## 2023-08-14 VITALS
OXYGEN SATURATION: 100 % | RESPIRATION RATE: 16 BRPM | DIASTOLIC BLOOD PRESSURE: 69 MMHG | SYSTOLIC BLOOD PRESSURE: 110 MMHG | TEMPERATURE: 100 F | HEART RATE: 97 BPM

## 2023-08-14 LAB
ALBUMIN SERPL ELPH-MCNC: 4.7 G/DL — SIGNIFICANT CHANGE UP (ref 3.3–5)
ALP SERPL-CCNC: 52 U/L — SIGNIFICANT CHANGE UP (ref 40–120)
ALT FLD-CCNC: 19 U/L — SIGNIFICANT CHANGE UP (ref 4–33)
ANION GAP SERPL CALC-SCNC: 12 MMOL/L — SIGNIFICANT CHANGE UP (ref 7–14)
APPEARANCE UR: CLEAR — SIGNIFICANT CHANGE UP
APTT BLD: 31.4 SEC — SIGNIFICANT CHANGE UP (ref 24.5–35.6)
AST SERPL-CCNC: 22 U/L — SIGNIFICANT CHANGE UP (ref 4–32)
BACTERIA # UR AUTO: ABNORMAL /HPF
BASOPHILS # BLD AUTO: 0.02 K/UL — SIGNIFICANT CHANGE UP (ref 0–0.2)
BASOPHILS NFR BLD AUTO: 0.2 % — SIGNIFICANT CHANGE UP (ref 0–2)
BILIRUB SERPL-MCNC: 0.8 MG/DL — SIGNIFICANT CHANGE UP (ref 0.2–1.2)
BILIRUB UR-MCNC: NEGATIVE — SIGNIFICANT CHANGE UP
BUN SERPL-MCNC: 8 MG/DL — SIGNIFICANT CHANGE UP (ref 7–23)
CALCIUM SERPL-MCNC: 9.5 MG/DL — SIGNIFICANT CHANGE UP (ref 8.4–10.5)
CAST: 1 /LPF — SIGNIFICANT CHANGE UP (ref 0–4)
CHLORIDE SERPL-SCNC: 97 MMOL/L — LOW (ref 98–107)
CO2 SERPL-SCNC: 27 MMOL/L — SIGNIFICANT CHANGE UP (ref 22–31)
COLOR SPEC: YELLOW — SIGNIFICANT CHANGE UP
CREAT SERPL-MCNC: 0.74 MG/DL — SIGNIFICANT CHANGE UP (ref 0.5–1.3)
DIFF PNL FLD: ABNORMAL
EGFR: 117 ML/MIN/1.73M2 — SIGNIFICANT CHANGE UP
EOSINOPHIL # BLD AUTO: 0.04 K/UL — SIGNIFICANT CHANGE UP (ref 0–0.5)
EOSINOPHIL NFR BLD AUTO: 0.4 % — SIGNIFICANT CHANGE UP (ref 0–6)
GLUCOSE SERPL-MCNC: 107 MG/DL — HIGH (ref 70–99)
GLUCOSE UR QL: NEGATIVE MG/DL — SIGNIFICANT CHANGE UP
HCT VFR BLD CALC: 38.1 % — SIGNIFICANT CHANGE UP (ref 34.5–45)
HGB BLD-MCNC: 13.2 G/DL — SIGNIFICANT CHANGE UP (ref 11.5–15.5)
IANC: 7.4 K/UL — SIGNIFICANT CHANGE UP (ref 1.8–7.4)
IMM GRANULOCYTES NFR BLD AUTO: 0.3 % — SIGNIFICANT CHANGE UP (ref 0–0.9)
INR BLD: 1.24 RATIO — HIGH (ref 0.85–1.18)
KETONES UR-MCNC: ABNORMAL MG/DL
LACTATE BLDV-MCNC: 1.9 MMOL/L — SIGNIFICANT CHANGE UP (ref 0.5–2)
LEUKOCYTE ESTERASE UR-ACNC: ABNORMAL
LYMPHOCYTES # BLD AUTO: 0.92 K/UL — LOW (ref 1–3.3)
LYMPHOCYTES # BLD AUTO: 9.4 % — LOW (ref 13–44)
MCHC RBC-ENTMCNC: 29.5 PG — SIGNIFICANT CHANGE UP (ref 27–34)
MCHC RBC-ENTMCNC: 34.6 GM/DL — SIGNIFICANT CHANGE UP (ref 32–36)
MCV RBC AUTO: 85.2 FL — SIGNIFICANT CHANGE UP (ref 80–100)
MONOCYTES # BLD AUTO: 1.36 K/UL — HIGH (ref 0–0.9)
MONOCYTES NFR BLD AUTO: 13.9 % — SIGNIFICANT CHANGE UP (ref 2–14)
NEUTROPHILS # BLD AUTO: 7.4 K/UL — SIGNIFICANT CHANGE UP (ref 1.8–7.4)
NEUTROPHILS NFR BLD AUTO: 75.8 % — SIGNIFICANT CHANGE UP (ref 43–77)
NITRITE UR-MCNC: POSITIVE
NRBC # BLD: 0 /100 WBCS — SIGNIFICANT CHANGE UP (ref 0–0)
NRBC # FLD: 0 K/UL — SIGNIFICANT CHANGE UP (ref 0–0)
PH UR: 6.5 — SIGNIFICANT CHANGE UP (ref 5–8)
PLATELET # BLD AUTO: 208 K/UL — SIGNIFICANT CHANGE UP (ref 150–400)
POTASSIUM SERPL-MCNC: 3.6 MMOL/L — SIGNIFICANT CHANGE UP (ref 3.5–5.3)
POTASSIUM SERPL-SCNC: 3.6 MMOL/L — SIGNIFICANT CHANGE UP (ref 3.5–5.3)
PROT SERPL-MCNC: 8.7 G/DL — HIGH (ref 6–8.3)
PROT UR-MCNC: 30 MG/DL
PROTHROM AB SERPL-ACNC: 13.8 SEC — HIGH (ref 9.5–13)
RBC # BLD: 4.47 M/UL — SIGNIFICANT CHANGE UP (ref 3.8–5.2)
RBC # FLD: 11.9 % — SIGNIFICANT CHANGE UP (ref 10.3–14.5)
RBC CASTS # UR COMP ASSIST: 10 /HPF — HIGH (ref 0–4)
SODIUM SERPL-SCNC: 136 MMOL/L — SIGNIFICANT CHANGE UP (ref 135–145)
SP GR SPEC: 1.02 — SIGNIFICANT CHANGE UP (ref 1–1.03)
SQUAMOUS # UR AUTO: 0 /HPF — SIGNIFICANT CHANGE UP (ref 0–5)
UROBILINOGEN FLD QL: 1 MG/DL — SIGNIFICANT CHANGE UP (ref 0.2–1)
WBC # BLD: 9.77 K/UL — SIGNIFICANT CHANGE UP (ref 3.8–10.5)
WBC # FLD AUTO: 9.77 K/UL — SIGNIFICANT CHANGE UP (ref 3.8–10.5)
WBC UR QL: 95 /HPF — HIGH (ref 0–5)

## 2023-08-14 PROCEDURE — 99285 EMERGENCY DEPT VISIT HI MDM: CPT

## 2023-08-14 RX ORDER — KETOROLAC TROMETHAMINE 30 MG/ML
15 SYRINGE (ML) INJECTION ONCE
Refills: 0 | Status: DISCONTINUED | OUTPATIENT
Start: 2023-08-14 | End: 2023-08-14

## 2023-08-14 RX ORDER — SODIUM CHLORIDE 9 MG/ML
1000 INJECTION INTRAMUSCULAR; INTRAVENOUS; SUBCUTANEOUS ONCE
Refills: 0 | Status: COMPLETED | OUTPATIENT
Start: 2023-08-14 | End: 2023-08-14

## 2023-08-14 RX ORDER — CEFTRIAXONE 500 MG/1
1000 INJECTION, POWDER, FOR SOLUTION INTRAMUSCULAR; INTRAVENOUS ONCE
Refills: 0 | Status: COMPLETED | OUTPATIENT
Start: 2023-08-14 | End: 2023-08-14

## 2023-08-14 RX ADMIN — SODIUM CHLORIDE 1000 MILLILITER(S): 9 INJECTION INTRAMUSCULAR; INTRAVENOUS; SUBCUTANEOUS at 22:30

## 2023-08-14 RX ADMIN — Medication 15 MILLIGRAM(S): at 22:30

## 2023-08-14 NOTE — ED PROVIDER NOTE - PHYSICAL EXAMINATION
GEN - NAD; well appearing; A+O x3   HEAD - NC/AT   ENT: Airway patent, mmm  NECK: Neck supple  PULMONARY - CTA b/l, symmetric breath sounds.   CARDIAC -s1s2, RRR, no M,G,R  ABDOMEN - +BS, ND, TTP throughout R side of abd, soft, no guarding, no rebound, no masses   BACK - R CVA tenderness, Normal  spine   EXTREMITIES - FROM  NEUROLOGIC - alert, speech clear  PSYCH -nl mood/affect, nl insight.

## 2023-08-14 NOTE — ED ADULT NURSE NOTE - OBJECTIVE STATEMENT
Pt received in room 1. Pt alert and oriented x 4, ambulatory at baseline. Hx of sickle cell trait, asthma. Pt c/o intermittent sharp RLQ abdominal pain rated 7/10, dysuria, and fever that began about 1 week ago. Pt reports visiting urgent care around 3pm today who referred her to the ED. Pt reports relief of pain with Tylenol at home. Respirations even and unlabored. Abdomen soft, round, nondistended, tender in RLQ. No edema x 4 extremities. Pt denies chest pain, shortness of breath. vomiting, diarrhea, headache, dizziness, weakness, dark stools. 20G IV in the right AC, labs drawn and pt medicated per MD orders. Awaiting CT scan.

## 2023-08-14 NOTE — ED PROVIDER NOTE - CLINICAL SUMMARY MEDICAL DECISION MAKING FREE TEXT BOX
Otherwise healthy female presents emergency department with 1 week of right flank pain rating to right abdomen associated with nausea, vomiting, fever.  Denies any dysuria.  On exam she does have significant tenderness on the right side of her abdomen as well as right CVA tenderness, she is also noted to be febrile to 1-2.8 here.  Plan for labs with blood cultures, UA and urine culture despite not having any dysuria at this time, CT abdomen pelvis evaluate for possible infected kidney stone, pyelonephritis, colitis, appendicitis.

## 2023-08-14 NOTE — ED ADULT TRIAGE NOTE - CHIEF COMPLAINT QUOTE
Pt arrives ambulatory to triage, sent by urgent care to r/o renal calculi vs pyelonephritis. Pt c/o of right-sided abdominal pain, dysuria, and fevers x1 wk. Given tylenol PTA. PMHx: sickle cell trait, asthma.

## 2023-08-14 NOTE — ED PROVIDER NOTE - PATIENT PORTAL LINK FT
You can access the FollowMyHealth Patient Portal offered by [facility] by registering at the following website: [email or web address]. By joining [facility]’s FollowMyHealth portal, you will also be able to view your health information using other applications (apps) compatible with our system.

## 2023-08-14 NOTE — ED PROVIDER NOTE - OBJECTIVE STATEMENT
23-year-old female history of asthma without any hospitalizations since childhood, presents emergency department with 1 week of right flank pain radiating to right lower quadrant and right upper quadrant associate with fever and nausea with vomiting.  She reports having normal bowel movement, denies any dysuria, denies any surgical history.

## 2023-08-14 NOTE — ED ADULT NURSE NOTE - NSFALLUNIVINTERV_ED_ALL_ED
Bed/Stretcher in lowest position, wheels locked, appropriate side rails in place/Call bell, personal items and telephone in reach/Instruct patient to call for assistance before getting out of bed/chair/stretcher/Non-slip footwear applied when patient is off stretcher/[place] to call system/Physically safe environment - no spills, clutter or unnecessary equipment/Purposeful proactive rounding/Room/bathroom lighting operational, light cord in reach

## 2023-08-15 VITALS
OXYGEN SATURATION: 100 % | DIASTOLIC BLOOD PRESSURE: 66 MMHG | TEMPERATURE: 103 F | SYSTOLIC BLOOD PRESSURE: 109 MMHG | RESPIRATION RATE: 18 BRPM | HEART RATE: 112 BPM

## 2023-08-15 PROCEDURE — G1004: CPT

## 2023-08-15 PROCEDURE — 74177 CT ABD & PELVIS W/CONTRAST: CPT | Mod: 26,ME

## 2023-08-15 RX ORDER — ONDANSETRON 8 MG/1
1 TABLET, FILM COATED ORAL
Qty: 20 | Refills: 1
Start: 2023-08-15 | End: 2023-08-24

## 2023-08-15 RX ORDER — ACETAMINOPHEN 500 MG
1000 TABLET ORAL ONCE
Refills: 0 | Status: COMPLETED | OUTPATIENT
Start: 2023-08-15 | End: 2023-08-15

## 2023-08-15 RX ORDER — CEFPODOXIME PROXETIL 100 MG
1 TABLET ORAL
Qty: 20 | Refills: 0
Start: 2023-08-15 | End: 2023-08-24

## 2023-08-15 RX ORDER — IBUPROFEN 200 MG
1 TABLET ORAL
Qty: 21 | Refills: 0
Start: 2023-08-15 | End: 2023-08-21

## 2023-08-15 RX ADMIN — CEFTRIAXONE 100 MILLIGRAM(S): 500 INJECTION, POWDER, FOR SOLUTION INTRAMUSCULAR; INTRAVENOUS at 00:33

## 2023-08-15 RX ADMIN — Medication 400 MILLIGRAM(S): at 04:15

## 2023-08-20 LAB
CULTURE RESULTS: SIGNIFICANT CHANGE UP
CULTURE RESULTS: SIGNIFICANT CHANGE UP
SPECIMEN SOURCE: SIGNIFICANT CHANGE UP
SPECIMEN SOURCE: SIGNIFICANT CHANGE UP

## 2024-10-25 NOTE — OB RN TRIAGE NOTE - RESPIRATORY RATE (BREATHS/MIN)
Return or seek medical attention with increasing or severe pain, difficulty breathing, or other concerns.  Elevate and ice right arm for up to 20 minutes at a time  Complete doxycycline.  
20

## 2024-12-11 ENCOUNTER — EMERGENCY (EMERGENCY)
Facility: HOSPITAL | Age: 24
LOS: 1 days | Discharge: HOME | End: 2024-12-11
Admitting: STUDENT IN AN ORGANIZED HEALTH CARE EDUCATION/TRAINING PROGRAM
Payer: SELF-PAY

## 2024-12-11 VITALS
OXYGEN SATURATION: 100 % | TEMPERATURE: 101 F | WEIGHT: 110.01 LBS | SYSTOLIC BLOOD PRESSURE: 108 MMHG | DIASTOLIC BLOOD PRESSURE: 68 MMHG | HEART RATE: 114 BPM | RESPIRATION RATE: 16 BRPM

## 2024-12-11 VITALS
RESPIRATION RATE: 16 BRPM | DIASTOLIC BLOOD PRESSURE: 70 MMHG | SYSTOLIC BLOOD PRESSURE: 100 MMHG | TEMPERATURE: 98 F | HEART RATE: 81 BPM | OXYGEN SATURATION: 97 %

## 2024-12-11 LAB
ADD ON TEST-SPECIMEN IN LAB: SIGNIFICANT CHANGE UP
ALBUMIN SERPL ELPH-MCNC: 4.1 G/DL — SIGNIFICANT CHANGE UP (ref 3.3–5)
ALP SERPL-CCNC: 34 U/L — LOW (ref 40–120)
ALT FLD-CCNC: 27 U/L — SIGNIFICANT CHANGE UP (ref 4–33)
ANION GAP SERPL CALC-SCNC: 15 MMOL/L — HIGH (ref 7–14)
APPEARANCE UR: ABNORMAL
AST SERPL-CCNC: 41 U/L — HIGH (ref 4–32)
BACTERIA # UR AUTO: NEGATIVE /HPF — SIGNIFICANT CHANGE UP
BASOPHILS # BLD AUTO: 0 K/UL — SIGNIFICANT CHANGE UP (ref 0–0.2)
BASOPHILS NFR BLD AUTO: 0 % — SIGNIFICANT CHANGE UP (ref 0–2)
BILIRUB SERPL-MCNC: 0.3 MG/DL — SIGNIFICANT CHANGE UP (ref 0.2–1.2)
BILIRUB UR-MCNC: NEGATIVE — SIGNIFICANT CHANGE UP
BLD GP AB SCN SERPL QL: NEGATIVE — SIGNIFICANT CHANGE UP
BUN SERPL-MCNC: 7 MG/DL — SIGNIFICANT CHANGE UP (ref 7–23)
CALCIUM SERPL-MCNC: 9.3 MG/DL — SIGNIFICANT CHANGE UP (ref 8.4–10.5)
CAST: 0 /LPF — SIGNIFICANT CHANGE UP (ref 0–4)
CHLORIDE SERPL-SCNC: 94 MMOL/L — LOW (ref 98–107)
CO2 SERPL-SCNC: 24 MMOL/L — SIGNIFICANT CHANGE UP (ref 22–31)
COLOR SPEC: YELLOW — SIGNIFICANT CHANGE UP
CREAT SERPL-MCNC: 0.64 MG/DL — SIGNIFICANT CHANGE UP (ref 0.5–1.3)
DIFF PNL FLD: ABNORMAL
EGFR: 127 ML/MIN/1.73M2 — SIGNIFICANT CHANGE UP
EOSINOPHIL # BLD AUTO: 0 K/UL — SIGNIFICANT CHANGE UP (ref 0–0.5)
EOSINOPHIL NFR BLD AUTO: 0 % — SIGNIFICANT CHANGE UP (ref 0–6)
FLUAV AG NPH QL: DETECTED
FLUBV AG NPH QL: SIGNIFICANT CHANGE UP
GIANT PLATELETS BLD QL SMEAR: PRESENT — SIGNIFICANT CHANGE UP
GLUCOSE SERPL-MCNC: 99 MG/DL — SIGNIFICANT CHANGE UP (ref 70–99)
GLUCOSE UR QL: NEGATIVE MG/DL — SIGNIFICANT CHANGE UP
HCT VFR BLD CALC: 41.1 % — SIGNIFICANT CHANGE UP (ref 34.5–45)
HGB BLD-MCNC: 14.3 G/DL — SIGNIFICANT CHANGE UP (ref 11.5–15.5)
IANC: 1.86 K/UL — SIGNIFICANT CHANGE UP (ref 1.8–7.4)
KETONES UR-MCNC: ABNORMAL MG/DL
LEUKOCYTE ESTERASE UR-ACNC: NEGATIVE — SIGNIFICANT CHANGE UP
LYMPHOCYTES # BLD AUTO: 0.73 K/UL — LOW (ref 1–3.3)
LYMPHOCYTES # BLD AUTO: 22.6 % — SIGNIFICANT CHANGE UP (ref 13–44)
MANUAL SMEAR VERIFICATION: SIGNIFICANT CHANGE UP
MCHC RBC-ENTMCNC: 29.7 PG — SIGNIFICANT CHANGE UP (ref 27–34)
MCHC RBC-ENTMCNC: 34.8 G/DL — SIGNIFICANT CHANGE UP (ref 32–36)
MCV RBC AUTO: 85.4 FL — SIGNIFICANT CHANGE UP (ref 80–100)
METAMYELOCYTES # FLD: 0.9 % — SIGNIFICANT CHANGE UP (ref 0–1)
MONOCYTES # BLD AUTO: 0.7 K/UL — SIGNIFICANT CHANGE UP (ref 0–0.9)
MONOCYTES NFR BLD AUTO: 21.7 % — HIGH (ref 2–14)
NEUTROPHILS # BLD AUTO: 1.65 K/UL — LOW (ref 1.8–7.4)
NEUTROPHILS NFR BLD AUTO: 50.4 % — SIGNIFICANT CHANGE UP (ref 43–77)
NEUTS BAND # BLD: 0.9 % — SIGNIFICANT CHANGE UP (ref 0–6)
NITRITE UR-MCNC: NEGATIVE — SIGNIFICANT CHANGE UP
NRBC # BLD: 1 /100 WBCS — HIGH (ref 0–0)
PH UR: 6.5 — SIGNIFICANT CHANGE UP (ref 5–8)
PLAT MORPH BLD: NORMAL — SIGNIFICANT CHANGE UP
PLATELET # BLD AUTO: 229 K/UL — SIGNIFICANT CHANGE UP (ref 150–400)
PLATELET COUNT - ESTIMATE: NORMAL — SIGNIFICANT CHANGE UP
POLYCHROMASIA BLD QL SMEAR: SLIGHT — SIGNIFICANT CHANGE UP
POTASSIUM SERPL-MCNC: 4 MMOL/L — SIGNIFICANT CHANGE UP (ref 3.5–5.3)
POTASSIUM SERPL-SCNC: 4 MMOL/L — SIGNIFICANT CHANGE UP (ref 3.5–5.3)
PROT SERPL-MCNC: 8 G/DL — SIGNIFICANT CHANGE UP (ref 6–8.3)
PROT UR-MCNC: 30 MG/DL
RBC # BLD: 4.81 M/UL — SIGNIFICANT CHANGE UP (ref 3.8–5.2)
RBC # FLD: 12.7 % — SIGNIFICANT CHANGE UP (ref 10.3–14.5)
RBC BLD AUTO: NORMAL — SIGNIFICANT CHANGE UP
RBC CASTS # UR COMP ASSIST: 4 /HPF — SIGNIFICANT CHANGE UP (ref 0–4)
REVIEW: SIGNIFICANT CHANGE UP
RH IG SCN BLD-IMP: POSITIVE — SIGNIFICANT CHANGE UP
RSV RNA NPH QL NAA+NON-PROBE: SIGNIFICANT CHANGE UP
SARS-COV-2 RNA SPEC QL NAA+PROBE: SIGNIFICANT CHANGE UP
SMUDGE CELLS # BLD: PRESENT — SIGNIFICANT CHANGE UP
SODIUM SERPL-SCNC: 133 MMOL/L — LOW (ref 135–145)
SP GR SPEC: 1.02 — SIGNIFICANT CHANGE UP (ref 1–1.03)
SQUAMOUS # UR AUTO: 5 /HPF — SIGNIFICANT CHANGE UP (ref 0–5)
UROBILINOGEN FLD QL: 1 MG/DL — SIGNIFICANT CHANGE UP (ref 0.2–1)
VARIANT LYMPHS # BLD: 3.5 % — SIGNIFICANT CHANGE UP (ref 0–6)
WBC # BLD: 3.22 K/UL — LOW (ref 3.8–10.5)
WBC # FLD AUTO: 3.22 K/UL — LOW (ref 3.8–10.5)
WBC UR QL: 3 /HPF — SIGNIFICANT CHANGE UP (ref 0–5)

## 2024-12-11 PROCEDURE — 71046 X-RAY EXAM CHEST 2 VIEWS: CPT | Mod: 26

## 2024-12-11 PROCEDURE — 99053 MED SERV 10PM-8AM 24 HR FAC: CPT

## 2024-12-11 PROCEDURE — 99285 EMERGENCY DEPT VISIT HI MDM: CPT

## 2024-12-11 PROCEDURE — 76830 TRANSVAGINAL US NON-OB: CPT | Mod: 26

## 2024-12-11 RX ORDER — SODIUM CHLORIDE 9 MG/ML
1000 INJECTION, SOLUTION INTRAMUSCULAR; INTRAVENOUS; SUBCUTANEOUS ONCE
Refills: 0 | Status: COMPLETED | OUTPATIENT
Start: 2024-12-11 | End: 2024-12-11

## 2024-12-11 RX ORDER — OSELTAMIVIR PHOSPHATE 75 MG
75 CAPSULE ORAL ONCE
Refills: 0 | Status: COMPLETED | OUTPATIENT
Start: 2024-12-11 | End: 2024-12-11

## 2024-12-11 RX ORDER — KETOROLAC TROMETHAMINE 30 MG/ML
30 INJECTION INTRAMUSCULAR; INTRAVENOUS ONCE
Refills: 0 | Status: DISCONTINUED | OUTPATIENT
Start: 2024-12-11 | End: 2024-12-11

## 2024-12-11 RX ORDER — ACETAMINOPHEN 500MG 500 MG/1
650 TABLET, COATED ORAL ONCE
Refills: 0 | Status: COMPLETED | OUTPATIENT
Start: 2024-12-11 | End: 2024-12-11

## 2024-12-11 RX ORDER — ONDANSETRON HYDROCHLORIDE 4 MG/1
1 TABLET, FILM COATED ORAL
Qty: 20 | Refills: 0
Start: 2024-12-11 | End: 2024-12-15

## 2024-12-11 RX ORDER — FAMOTIDINE 20 MG/1
20 TABLET, FILM COATED ORAL ONCE
Refills: 0 | Status: COMPLETED | OUTPATIENT
Start: 2024-12-11 | End: 2024-12-11

## 2024-12-11 RX ORDER — OSELTAMIVIR PHOSPHATE 75 MG
1 CAPSULE ORAL
Qty: 10 | Refills: 0
Start: 2024-12-11 | End: 2024-12-15

## 2024-12-11 RX ORDER — 0.9 % SODIUM CHLORIDE 0.9 %
1000 INTRAVENOUS SOLUTION INTRAVENOUS ONCE
Refills: 0 | Status: COMPLETED | OUTPATIENT
Start: 2024-12-11 | End: 2024-12-11

## 2024-12-11 RX ORDER — ONDANSETRON HYDROCHLORIDE 4 MG/1
4 TABLET, FILM COATED ORAL ONCE
Refills: 0 | Status: COMPLETED | OUTPATIENT
Start: 2024-12-11 | End: 2024-12-11

## 2024-12-11 RX ADMIN — Medication 75 MILLIGRAM(S): at 06:26

## 2024-12-11 RX ADMIN — SODIUM CHLORIDE 1000 MILLILITER(S): 9 INJECTION, SOLUTION INTRAMUSCULAR; INTRAVENOUS; SUBCUTANEOUS at 01:40

## 2024-12-11 RX ADMIN — ONDANSETRON HYDROCHLORIDE 4 MILLIGRAM(S): 4 TABLET, FILM COATED ORAL at 01:41

## 2024-12-11 RX ADMIN — ACETAMINOPHEN 500MG 650 MILLIGRAM(S): 500 TABLET, COATED ORAL at 01:41

## 2024-12-11 RX ADMIN — FAMOTIDINE 20 MILLIGRAM(S): 20 TABLET, FILM COATED ORAL at 03:23

## 2024-12-11 RX ADMIN — Medication 1000 MILLILITER(S): at 03:23

## 2024-12-11 NOTE — ED PROVIDER NOTE - CLINICAL SUMMARY MEDICAL DECISION MAKING FREE TEXT BOX
25 y/o female pmh asthma, SSC p.w c/o subjective fevers, chills, dry cough, nausea x 2 days + child also sick at home, has beentaking tylenol with some relief, denies any HA< neck pain, chest pain, sob, abdominal pain, urinary symptoms, numbness/weakness/tingling, recent travel  on exam : + febrile  + tachy  labs, meds, cxr, ua, reasses

## 2024-12-11 NOTE — ED PROVIDER NOTE - OBJECTIVE STATEMENT
25 y/o female pmh asthma, SSC p.w c/o subjective fevers, chills, dry cough, nausea x 2 days + child also sick at home, has beentaking tylenol with some relief, denies any HA< neck pain, chest pain, sob, abdominal pain, urinary symptoms, numbness/weakness/tingling, recent travel

## 2024-12-11 NOTE — ED PROVIDER NOTE - NSFOLLOWUPINSTRUCTIONS_ED_ALL_ED_FT
Rest, drink plenty of fluids.  Take Tamiflu as prescribed and take Zofran as needed for nausea. Follow up with your primary doctor and OBGYN as your pregnancy test is still a residual positive. Advance activity as tolerated.  Continue all previously prescribed medications as directed.  Follow up with your primary care physician in 48-72 hours- bring copies of your results.  Return to the ER for worsening or persistent symptoms, and/or ANY NEW OR CONCERNING SYMPTOMS. THIS INCLUDES BUT IS NOT LIMITED TO ABDOMINAL PAIN, CHEST PAIN, SHORTNESS OF BREATH OR FOR ANY OTHER SYMPTOMS THAT CONCERN YOU. If you have issues obtaining follow up, please call: [phone] to obtain a doctor or specialist who takes your insurance in your area.  You may call [phone] to make an appointment with the internal medicine clinic.

## 2024-12-11 NOTE — ED PROVIDER NOTE - CARE PLAN
Principal Discharge DX:	Influenza A  Secondary Diagnosis:	Vaginal bleeding in pregnancy  Secondary Diagnosis:	Cough   1 Principal Discharge DX:	Influenza A  Secondary Diagnosis:	Cough  Secondary Diagnosis:	Vaginal bleeding

## 2024-12-11 NOTE — ED PROVIDER NOTE - PROGRESS NOTE DETAILS
KHANH Baker: Received signout on the pt. Pt states she is feeling better, pt states her lmp was 11/26 and states she follows with Dr. Encinas in [place]. Pt states she has had vaginal bleeding for the past week. Pt denies any other sx or acute complaints. U/S ordered to evaluate if the location of her pregnancy can be identified. KAHNH Baker: Pt spoke to the u/s tech and states she did not feel comfortable disclosing it to ED staff but endorses a medical termination for a twin gestation recently. U/S is therefore unlikely to demonstrate an IUP. KHANH Baker: Pt was reassessed, still feels better, u/s is not actionable, will d/c with PCP and GYN follow up, return precautions.

## 2024-12-11 NOTE — ED PROVIDER NOTE - PATIENT PORTAL LINK FT
You can access the FollowMyHealth Patient Portal offered by [facility] by registering at the following website: [email or web address]. By joining [facility]’s FollowMyHealth portal, you will also be able to view your health information using other applications (apps) compatible with our system.

## 2024-12-11 NOTE — ED ADULT TRIAGE NOTE - CHIEF COMPLAINT QUOTE
Pt c/o  headache, fever, chills, nausea, sore throat, body aches, generalized weakness x 4 days. No complaints of chest pain, , dizziness,  SOB, fever, chills verbalized..

## 2024-12-11 NOTE — ED ADULT NURSE NOTE - NSFALLRISKINTERV_ED_ALL_ED
Assistance with ambulation/Communicate fall risk and risk factors to all staff, patient, and family/Provide visual cue: yellow wristband, yellow gown, etc/Reinforce activity limits and safety measures with patient and family/Call bell, personal items and telephone in reach/Instruct patient to call for assistance before getting out of bed/chair/stretcher/Non-slip footwear applied when patient is off stretcher/[place] to call system/Physically safe environment - no spills, clutter or unnecessary equipment/Purposeful Proactive Rounding/Room/bathroom lighting operational, light cord in reach

## 2024-12-11 NOTE — ED ADULT NURSE NOTE - OBJECTIVE STATEMENT
Pt is A&Ox4 and ambulatory at baseline. Pt c/o headache, fever, chills, nausea, sore throat and generalized body aches x 4 days. Family is at the bedside. Respirations are equal and unlabored bilaterally. Abdomen is soft and non distended. Pt is not pallor or diaphoretic. #20G IV placed to the right arm. Labs drawn and sent as ordered. pt medicated as ordered. Denies CP, SOB, palpitations, dizziness, N/V/D, abdominal pain. Bed is in the lowest position and safety maintained.

## 2024-12-11 NOTE — ED PROVIDER NOTE - PROGRESS NOTE ADDITIONAL1
Cc: Gait dysfunction, spinal cord infarction    HPI: Patient with no new medical issues today.  Pain controlled with percocet but makes her sleepy, no chest pain, no N/V, no Fevers/Chills. No other new ROS  Has been tolerating rehabilitation program.    Vital Signs Last 24 Hrs  T(C): 36.5 (25 Dec 2018 08:49), Max: 36.5 (25 Dec 2018 08:49)  T(F): 97.7 (25 Dec 2018 08:49), Max: 97.7 (25 Dec 2018 08:49)  HR: 75 (25 Dec 2018 08:49) (75 - 79)  BP: 136/85 (25 Dec 2018 08:49) (136/85 - 147/83)  BP(mean): --  RR: 14 (25 Dec 2018 08:49) (14 - 14)  SpO2: 100% (25 Dec 2018 08:49) (99% - 100%)      In NAD  HEENT- EOMI  Heart- RRR, S1S2  Lungs- CTA bl.  Abd- + BS, NT  Ext- No calf pain  Neuro- Exam unchanged          Imp: Patient with diagnosis of  spinal cord infarction  admitted for comprehensive acute rehabilitation.    Plan:  - Continue PT/OT/SLP  - DVT prophylaxis  - Skin- Turn q2h, check skin daily  - Continue current medications; patient medically stable.   -Active issues- none  - Patient is stable to continue current rehabilitation program. Additional Progress Note...

## 2024-12-12 LAB
CULTURE RESULTS: SIGNIFICANT CHANGE UP
SPECIMEN SOURCE: SIGNIFICANT CHANGE UP

## 2025-05-28 NOTE — ED PROVIDER NOTE - NS ED ATTENDING STATEMENT MOD
no
This was a shared visit with the JUAN. I reviewed and verified the documentation and independently performed the documented: